# Patient Record
Sex: FEMALE | Race: WHITE | Employment: FULL TIME | URBAN - METROPOLITAN AREA
[De-identification: names, ages, dates, MRNs, and addresses within clinical notes are randomized per-mention and may not be internally consistent; named-entity substitution may affect disease eponyms.]

---

## 2017-01-01 ENCOUNTER — TRANSFERRED RECORDS (OUTPATIENT)
Dept: HEALTH INFORMATION MANAGEMENT | Facility: CLINIC | Age: 22
End: 2017-01-01

## 2017-01-01 LAB — PAP SMEAR - HIM PATIENT REPORTED: NORMAL

## 2018-03-26 ENCOUNTER — OFFICE VISIT (OUTPATIENT)
Dept: PEDIATRICS | Facility: CLINIC | Age: 23
End: 2018-03-26
Payer: COMMERCIAL

## 2018-03-26 VITALS
BODY MASS INDEX: 22 KG/M2 | TEMPERATURE: 97.6 F | WEIGHT: 136.9 LBS | DIASTOLIC BLOOD PRESSURE: 77 MMHG | HEART RATE: 111 BPM | SYSTOLIC BLOOD PRESSURE: 116 MMHG | HEIGHT: 66 IN

## 2018-03-26 DIAGNOSIS — H10.13 ALLERGIC CONJUNCTIVITIS, BILATERAL: Primary | ICD-10-CM

## 2018-03-26 PROCEDURE — 99203 OFFICE O/P NEW LOW 30 MIN: CPT | Performed by: NURSE PRACTITIONER

## 2018-03-26 RX ORDER — LEVONORGESTREL AND ETHINYL ESTRADIOL 0.15-0.03
KIT ORAL DAILY
COMMUNITY
Start: 2018-01-09 | End: 2020-11-02

## 2018-03-26 RX ORDER — OLOPATADINE HYDROCHLORIDE 2 MG/ML
1 SOLUTION/ DROPS OPHTHALMIC DAILY
Qty: 2.5 ML | Refills: 3 | Status: SHIPPED | OUTPATIENT
Start: 2018-03-26 | End: 2020-07-20

## 2018-03-26 NOTE — MR AVS SNAPSHOT
"              After Visit Summary   3/26/2018    Tram Haq    MRN: [id number]           Patient Information     Date Of Birth          1995        Visit Information        Provider Department      3/26/2018 12:45 PM Elizabeth Lange APRN CNP [facility]        Today's Diagnoses     Allergic conjunctivitis, bilateral    -  1      Care Instructions    Start an OTC claritin or zyrtec. If that doesn't work, try the prescription eye drop. Call me if no improvement.           Follow-ups after your visit        Follow-up notes from your care team     Return in about 2 months (around 5/26/2018) for Routine Visit.      Who to contact     If you have questions or need follow up information about today's clinic visit or your schedule please contact [facility] directly at [phone].  Normal or non-critical lab and imaging results will be communicated to you by [employer], letter or phone within 4 business days after the clinic has received the results. If you do not hear from us within 7 days, please contact the clinic through MyChart or phone. If you have a critical or abnormal lab result, we will notify you by phone as soon as possible.  Submit refill requests through [employer] or call your pharmacy and they will forward the refill request to us. Please allow 3 business days for your refill to be completed.          Additional Information About Your Visit        MyChart Information     [employer] lets you send messages to your doctor, view your test results, renew your prescriptions, schedule appointments and more. To sign up, go to [email or web address] . Click on \"Log in\" on the left side of the screen, which will take you to the Welcome page. Then click on \"Sign up Now\" on the right side of the page.     You will be asked to enter the access code listed below, as well as some personal information. Please follow the directions to create your username and password.     Your access " "code is: [email or web address]  Expires: 2018  1:12 PM     Your access code will  in 90 days. If you need help or a new code, please call your [facility] clinic or [phone].        Care EveryWhere ID     This is your Care EveryWhere ID. This could be used by other organizations to access your [facility] medical records  [id number]        Your Vitals Were     Pulse Temperature Height BMI (Body Mass Index)          111 97.6  F (36.4  C) (Tympanic) 5' 6\" (1.676 m) 22.1 kg/m2         Blood Pressure from Last 3 Encounters:   18 116/77    Weight from Last 3 Encounters:   18 136 lb 14.4 oz (62.1 kg)              Today, you had the following     No orders found for display         Today's Medication Changes          These changes are accurate as of 3/26/18  1:12 PM.  If you have any questions, ask your nurse or doctor.               Start taking these medicines.        Dose/Directions    olopatadine HCl 0.2 % Soln   Commonly known as:  PATADAY   Used for:  Allergic conjunctivitis, bilateral   Started by:  Elizabeth Lange, APRN CNP        Dose:  1 drop   Place 1 drop into both eyes daily   Quantity:  2.5 mL   Refills:  3            Where to get your medicines      Some of these will need a paper prescription and others can be bought over the counter.  Ask your nurse if you have questions.     Bring a paper prescription for each of these medications     olopatadine HCl 0.2 % Soln                Primary Care Provider Office Phone # Fax #    [facility] [phone] [address] [facility] [address]        Equal Access to Services     [facility]: Hadii allison lama hadashtrung [place], waaxda luqadaha, qaybta kaalmada ward velez. So [facility] [phone].    ATENCIÓN: Si habla español, tiene a deleon disposición servicios gratuitos de asistencia lingüística. Llame al [phone].    We comply with applicable federal civil rights laws " and [place] laws. We do not discriminate on the basis of race, color, national origin, age, disability, sex, sexual orientation, or gender identity.            Thank you!     Thank you for choosing [facility]  for your care. Our goal is always to provide you with excellent care. Hearing back from our patients is one way we can continue to improve our services. Please take a few minutes to complete the written survey that you may receive in the mail after your visit with us. Thank you!             Your Updated Medication List - Protect others around you: Learn how to safely use, store and throw away your medicines at www.disposemymeds.org.          This list is accurate as of 3/26/18  1:12 PM.  Always use your most recent med list.                   Brand Name Dispense Instructions for use Diagnosis    CHATEAL 0.15-30 MG-MCG per tablet   Generic drug:  levonorgestrel-ethinyl estradiol      daily        MULTIVITAMIN ADULT PO           olopatadine HCl 0.2 % Soln    PATADAY    2.5 mL    Place 1 drop into both eyes daily    Allergic conjunctivitis, bilateral

## 2018-03-26 NOTE — PATIENT INSTRUCTIONS
Start an OTC claritin or zyrtec. If that doesn't work, try the prescription eye drop. Call me if no improvement.

## 2018-03-26 NOTE — PROGRESS NOTES
"  SUBJECTIVE:   Tram aHq is a 23 year old female who presents to clinic today for the following health issues    ALLERGIES      Duration: 2wks    Description:   Nasal congestion: no  Sneezing: no   Red, itchy eyes: YES with mattering,feels like something is constantly in eyes, having to wipe at eyes due to changing her vision sometimes.    Accompanying signs and symptoms: runny nose    History (similar episodes/allergy testing): None    Precipitating or alleviating factors: None    Therapies tried and outcome: Eye drop OTC-not effective    Eye crusted over and conjunctival injection and watery x 2 wks. 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 Throughout the day, she can have symptoms comes and goes. She notes the eye d/c can be sticky and stringy, eyes can be very itchy. She has no hx of allergies. She does not wear contacts or glasses. Has tried a moistening drop to eyes withOUT relief. She denies fever, fuzzy vision with d/c. Denies cough, SOB, wheezing, ear pain, throat symptoms.     Last pap about 1 yr ago    Problem list and histories reviewed & adjusted, as indicated.  Additional history: as documented    There is no problem list on file for this patient.    History reviewed. No pertinent surgical history.    Social History   Substance Use Topics     Smoking status: Current Some Day Smoker     Smokeless tobacco: Never Used     Alcohol use Yes      Comment: socially     Family History   Problem Relation Age of Onset     Hypertension Mother            Reviewed and updated as needed this visit by clinical staff       Reviewed and updated as needed this visit by Provider         ROS:  Constitutional, HEENT, cardiovascular, pulmonary, gi and gu systems are negative, except as otherwise noted.    OBJECTIVE:     /77  Pulse 111  Temp 97.6  F (36.4  C) (Tympanic)  Ht 5' 6\" (1.676 m)  Wt 136 lb 14.4 oz (62.1 kg)  BMI 22.1 kg/m2  Body mass index is 22.1 kg/(m^2).  GENERAL: healthy, alert and no " distress  EYES: PERRL, EOMI and conjunctiva/corneas- conjunctival injection bilat  HENT: ear canals and TM's normal, nose and mouth without ulcers or lesions  NECK: no adenopathy, no asymmetry, masses, or scars and thyroid normal to palpation  RESP: lungs clear to auscultation - no rales, rhonchi or wheezes  CV: regular rate and rhythm, normal S1 S2, no S3 or S4, no murmur, click or rub, no peripheral edema and peripheral pulses strong  SKIN: no suspicious lesions or rashes      ASSESSMENT/PLAN:     1. Allergic conjunctivitis, bilateral  Diagnosis reviewed. Will try OTC antihistamine and the eye drops. If no improvement she will follow up with me  - olopatadine HCl (PATADAY) 0.2 % SOLN; Place 1 drop into both eyes daily  Dispense: 2.5 mL; Refill: 3      SIRIA Blackmon [facility]

## 2018-04-06 ENCOUNTER — TELEPHONE (OUTPATIENT)
Dept: PEDIATRICS | Facility: CLINIC | Age: 23
End: 2018-04-06

## 2018-04-06 NOTE — TELEPHONE ENCOUNTER
Panel Management Review          Composite cancer screening  Chart review shows that this patient is due/due soon for the following None  Summary:    Patient is due/failing the following:   GC SCREENING,TETANUS BOOSTER AND GARDASIL #3.    Action needed:   Patient needs non-fasting lab only appointment and Patient needs nurse only appointment.    Type of outreach:    Sent letter.    Questions for provider review:    None                                                                                                                                    Aissatou Casanova CMA([facility])

## 2018-04-06 NOTE — LETTER
April 6, 2018      Tram Haq  [address]   [place] [address]        Dear Tram,       We care about your health and have reviewed your health plan including your medical conditions, medications, and lab results.  Based on this review, it is recommended that you follow up regarding the following health topic(s):  -Chlamydia Screening  -Tetanus and Gardasil injections.    We recommend you take the following action(s):  -MAKE A LAB ONLY APPOINTMENT TO SCREEN FOR GONORRHEA/CHLAMYDIA AND MAKE A NURSE ONLY APPOINTMENT TO UPDATE YOUR TETANUS SHOT AND GET YOUR 3RD GARDASIL SHOT.     Please call us at the [facility] - [phone] (or use [employer]) to address the above recommendations.     Thank you for trusting [facility] and we appreciate the opportunity to serve you.  We look forward to supporting your healthcare needs in the future.    Healthy Regards,    Your Health Care Team  [facility]

## 2018-04-24 ENCOUNTER — TELEPHONE (OUTPATIENT)
Dept: PEDIATRICS | Facility: CLINIC | Age: 23
End: 2018-04-24

## 2018-04-24 DIAGNOSIS — H10.13 ALLERGIC CONJUNCTIVITIS, BILATERAL: Primary | ICD-10-CM

## 2018-04-24 NOTE — TELEPHONE ENCOUNTER
Patient states she was seen a couple weeks ago for allergies and given eye drops. They helped with the watery ness and the redness but now her eyes are all swollen. She would like to get a referral to an allergist. [phone] ok to lm.  Per office visit of 3/26/18  Start an OTC claritin or zyrtec. If that doesn't work, try the prescription eye drop. Call me if no improvement.         Order pended for signature if appropriate.  Jocelyn Sidhu, RN

## 2020-02-03 ENCOUNTER — OFFICE VISIT (OUTPATIENT)
Dept: FAMILY MEDICINE | Facility: CLINIC | Age: 25
End: 2020-02-03
Payer: COMMERCIAL

## 2020-02-03 VITALS
TEMPERATURE: 97.9 F | HEIGHT: 67 IN | DIASTOLIC BLOOD PRESSURE: 80 MMHG | SYSTOLIC BLOOD PRESSURE: 120 MMHG | WEIGHT: 139.4 LBS | BODY MASS INDEX: 21.88 KG/M2 | HEART RATE: 60 BPM | OXYGEN SATURATION: 97 %

## 2020-02-03 DIAGNOSIS — R21 RASH AND NONSPECIFIC SKIN ERUPTION: ICD-10-CM

## 2020-02-03 DIAGNOSIS — H66.001 NON-RECURRENT ACUTE SUPPURATIVE OTITIS MEDIA OF RIGHT EAR WITHOUT SPONTANEOUS RUPTURE OF TYMPANIC MEMBRANE: Primary | ICD-10-CM

## 2020-02-03 PROCEDURE — 99213 OFFICE O/P EST LOW 20 MIN: CPT | Performed by: NURSE PRACTITIONER

## 2020-02-03 RX ORDER — AZITHROMYCIN 250 MG/1
TABLET, FILM COATED ORAL
Qty: 6 TABLET | Refills: 0 | Status: SHIPPED | OUTPATIENT
Start: 2020-02-03 | End: 2020-07-20

## 2020-02-03 RX ORDER — AZITHROMYCIN 250 MG/1
TABLET, FILM COATED ORAL
Qty: 6 TABLET | Refills: 0 | Status: SHIPPED | OUTPATIENT
Start: 2020-02-03 | End: 2020-02-03

## 2020-02-03 ASSESSMENT — MIFFLIN-ST. JEOR: SCORE: 1397.55

## 2020-02-03 NOTE — PATIENT INSTRUCTIONS
-Focus on increasing singing and artwork to release stress  -Daily prayer or mindfullness meditation/yoga

## 2020-02-03 NOTE — PROGRESS NOTES
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 Has dealt with stress by singing and prayer, which has been helpful. She has been sleeping and eating well. She works in , and is frequently exposed to illness.   Patient Active Problem List   Diagnosis     Allergic conjunctivitis, bilateral     Past Surgical History:   Procedure Laterality Date     C URETER ENDOSCOPY THRU URETEROSTOMY  2005     FEMUR SURGERY Right 2002    sledding accident fracture       Social History     Tobacco Use     Smoking status: Current Some Day Smoker     Smokeless tobacco: Never Used   Substance Use Topics     Alcohol use: Yes     Comment: socially     Family History   Problem Relation Age of Onset     Hypertension Mother            -------------------------------------  Reviewed and updated as needed this visit by Provider         Review of Systems   ROS COMP: Constitutional, HEENT, cardiovascular, pulmonary, gi and gu systems are negative, except as otherwise noted.      Objective    /80   Pulse 60   Temp 97.9  F (36.6  C) (Oral)   Ht 1.69 m (5' 6.54\")   Wt 63.2 kg (139 lb 6.4 oz)   SpO2 97%   BMI 22.14 kg/m    Body mass index is 22.14 kg/m .  Physical Exam   GENERAL: healthy, alert and no " distress  HENT: ear canals and TM's normal, nose and mouth without ulcers or lesions  NECK: bilateral anterior cervical adenopathy, no asymmetry, masses, or scars and thyroid normal to palpation  RESP: lungs clear to auscultation - no rales, rhonchi or wheezes  CV: regular rate and rhythm, normal S1 S2, no S3 or S4, no murmur, click or rub, no peripheral edema and peripheral pulses strong  ABDOMEN: soft, nontender, no hepatosplenomegaly, no masses and bowel sounds normal  MS: no gross musculoskeletal defects noted, no edema  SKIN: xerosis - fingers and bilateral hands    Diagnostic Test Results:  Labs reviewed in Epic  No results found for this or any previous visit (from the past 24 hour(s)).        Assessment & Plan   Problem List Items Addressed This Visit     None      Visit Diagnoses     Non-recurrent acute suppurative otitis media of right ear without spontaneous rupture of tympanic membrane    -  Primary    Rash and nonspecific skin eruption        Relevant Orders    DERMATOLOGY REFERRAL             Tobacco Cessation:   reports that she has been smoking. She has never used smokeless tobacco.          See Patient Instructions  No follow-ups on file.    SIRIA Bean CNP  [facility] [place]

## 2020-07-20 ENCOUNTER — OFFICE VISIT (OUTPATIENT)
Dept: FAMILY MEDICINE | Facility: CLINIC | Age: 25
End: 2020-07-20
Payer: COMMERCIAL

## 2020-07-20 VITALS
SYSTOLIC BLOOD PRESSURE: 122 MMHG | HEIGHT: 67 IN | HEART RATE: 109 BPM | OXYGEN SATURATION: 98 % | DIASTOLIC BLOOD PRESSURE: 76 MMHG | RESPIRATION RATE: 16 BRPM | TEMPERATURE: 96.5 F | BODY MASS INDEX: 20.84 KG/M2 | WEIGHT: 132.8 LBS

## 2020-07-20 DIAGNOSIS — N64.4 MASTALGIA: ICD-10-CM

## 2020-07-20 DIAGNOSIS — B36.0 TINEA VERSICOLOR: Primary | ICD-10-CM

## 2020-07-20 PROCEDURE — 99213 OFFICE O/P EST LOW 20 MIN: CPT | Performed by: PHYSICIAN ASSISTANT

## 2020-07-20 RX ORDER — KETOCONAZOLE 20 MG/ML
SHAMPOO TOPICAL DAILY PRN
Qty: 120 ML | Refills: 0 | Status: SHIPPED | OUTPATIENT
Start: 2020-07-20

## 2020-07-20 ASSESSMENT — MIFFLIN-ST. JEOR: SCORE: 1367.62

## 2020-07-20 NOTE — PROGRESS NOTES
Subjective     Tram Thomas is a 25 year old female who presents to clinic today for the following health issues:    HPI     Breast pain      Duration: intermittent throughout the month    Description  Location: Left breast at about 5 o'clock- more so to touch     Intensity:  mild, moderate    Accompanying signs and symptoms: none    History  Previous similar problem: no   Previous evaluation:  none    Precipitating or alleviating factors:  Trauma or overuse: no   Aggravating factors include: states pain is when touch     Therapies tried and outcome: n/a       Tram is a healthy 26 y/o female with 1 month hx of left breast pain that has been intermittent.  Today this is feeling better.  The pain is located at the 5 o clock position.  The pain seemed to be worse just before her period.  She is currently menstruating and has noticed the pain has improved.  No masses, skin changes or nipple discharge.  No family hx of breast cancer. She is not taking any birth control, no concerns for pregnancy at this time.       She has noticed skin rash this summer on the skin of her chest and arms.  She has scattered hypopigmented flat spots, no itching, burning or pain. This seems to be worse as her skin has been tanning.        Patient Active Problem List   Diagnosis     Allergic conjunctivitis, bilateral     Past Surgical History:   Procedure Laterality Date     C URETER ENDOSCOPY THRU URETEROSTOMY  2005     FEMUR SURGERY Right 2002    sledding accident fracture       Social History     Tobacco Use     Smoking status: Current Some Day Smoker     Smokeless tobacco: Never Used   Substance Use Topics     Alcohol use: Yes     Comment: socially     Family History   Problem Relation Age of Onset     Hypertension Mother          Current Outpatient Medications   Medication Sig Dispense Refill     ketoconazole (NIZORAL) 2 % external shampoo Apply topically daily as needed for itching or irritation 120 mL 0     Prenatal Vit-Fe  "Fum-FA-Omega (PRENATAL MULTI +DHA PO)        CHATEAL 0.15-30 MG-MCG per tablet daily       Multiple Vitamins-Minerals (MULTIVITAMIN ADULT PO)        Allergies   Allergen Reactions     Morphine      As a child       Reviewed and updated as needed this visit by Provider         Review of Systems   Constitutional, HEENT, cardiovascular, pulmonary, GI, , musculoskeletal, neuro, skin, endocrine and psych systems are negative, except as otherwise noted.      Objective    /76   Pulse 109   Temp 96.5  F (35.8  C) (Tympanic)   Resp 16   Ht 1.69 m (5' 6.54\")   Wt 60.2 kg (132 lb 12.8 oz)   SpO2 98%   BMI 21.09 kg/m    Body mass index is 21.09 kg/m .  Physical Exam   GENERAL: healthy, alert and no distress  RESP: lungs clear to auscultation - no rales, rhonchi or wheezes  BREAST: normal without masses, tenderness or nipple discharge and no palpable axillary masses or adenopathy  CV: regular rate and rhythm, normal S1 S2, no S3 or S4, no murmur, click or rub  SKIN: scattered hypopigmented plaques scattered on skin of arms and chest bilaterally  PSYCH: mentation appears normal, affect normal/bright    Diagnostic Test Results:  none         Assessment & Plan     1. Tinea versicolor  Rash is consistent with tinea versicolor.  She may use Nizoral daily x 1-2 weeks, she will follow up if no improvement.  - ketoconazole (NIZORAL) 2 % external shampoo; Apply topically daily as needed for itching or irritation  Dispense: 120 mL; Refill: 0    2. Mastalgia  Normal breast exam today.  Etiology most likely hormonal changes as this is mostly occurring right before menses.  Discussed signs and symptoms to watch for, she will continue to monitor her symptoms and if persistent she will follow up and we will consider US.       Tobacco Cessation:   reports that she has been smoking. She has never used smokeless tobacco.          Return in about 3 months (around 10/20/2020) for Physical Exam. and pap.    Jordan Concepcion, " NAVIN  [facility] [place]

## 2020-10-05 ENCOUNTER — PRENATAL OFFICE VISIT (OUTPATIENT)
Dept: NURSING | Facility: CLINIC | Age: 25
End: 2020-10-05
Payer: COMMERCIAL

## 2020-10-05 DIAGNOSIS — Z34.00 SUPERVISION OF NORMAL FIRST PREGNANCY: Primary | ICD-10-CM

## 2020-10-05 PROCEDURE — 99207 PR NO CHARGE NURSE ONLY: CPT

## 2020-10-05 SDOH — ECONOMIC STABILITY: FOOD INSECURITY: WITHIN THE PAST 12 MONTHS, YOU WORRIED THAT YOUR FOOD WOULD RUN OUT BEFORE YOU GOT MONEY TO BUY MORE.: NOT ASKED

## 2020-10-05 SDOH — HEALTH STABILITY: MENTAL HEALTH: HOW OFTEN DO YOU HAVE 6 OR MORE DRINKS ON ONE OCCASION?: NOT ASKED

## 2020-10-05 SDOH — HEALTH STABILITY: MENTAL HEALTH: HOW MANY STANDARD DRINKS CONTAINING ALCOHOL DO YOU HAVE ON A TYPICAL DAY?: NOT ASKED

## 2020-10-05 SDOH — ECONOMIC STABILITY: FOOD INSECURITY: WITHIN THE PAST 12 MONTHS, THE FOOD YOU BOUGHT JUST DIDN'T LAST AND YOU DIDN'T HAVE MONEY TO GET MORE.: NOT ASKED

## 2020-10-05 SDOH — HEALTH STABILITY: MENTAL HEALTH: HOW OFTEN DO YOU HAVE A DRINK CONTAINING ALCOHOL?: NOT ASKED

## 2020-10-05 SDOH — ECONOMIC STABILITY: INCOME INSECURITY: HOW HARD IS IT FOR YOU TO PAY FOR THE VERY BASICS LIKE FOOD, HOUSING, MEDICAL CARE, AND HEATING?: NOT ASKED

## 2020-10-05 SDOH — ECONOMIC STABILITY: TRANSPORTATION INSECURITY
IN THE PAST 12 MONTHS, HAS THE LACK OF TRANSPORTATION KEPT YOU FROM MEDICAL APPOINTMENTS OR FROM GETTING MEDICATIONS?: NOT ASKED

## 2020-10-05 SDOH — ECONOMIC STABILITY: TRANSPORTATION INSECURITY
IN THE PAST 12 MONTHS, HAS LACK OF TRANSPORTATION KEPT YOU FROM MEETINGS, WORK, OR FROM GETTING THINGS NEEDED FOR DAILY LIVING?: NOT ASKED

## 2020-10-05 NOTE — PROGRESS NOTES
NPN nurse visit done over the phone. Pt will be given NPN folder and book at her upcoming appt.   Discussed optional screening available to assess chromosomal anomalies. Questions answered. Pt advised to call the clinic if she has any questions or concerns related to her pregnancy. Prenatal labs will be obtained at her upcoming appt. New prenatal visit scheduled on 10/19/20 with BRIANA Shafer.    7w3d    Per pt last PAP normal-1/1/17        Patient supplied answers from flow sheet for:  Prenatal OB Questionnaire.  Past Medical History  Diabetes?: No  Hypertension : No  Heart disease, mitral valve prolapse or rheumatic fever?: No  An autoimmune disease such as lupus or rheumatoid arthritis?: No  Kidney disease or urinary tract infection?: No  Epilepsy, seizures or spells?: No  Migraine headaches?: No  A stroke or loss of function or sensation?: No  Any other neurological problems?: No  Have you ever been treated for depression?: No  Are you having problems with crying spells or loss of self-esteem?: No  Have you ever required psychiatric care?: No  Have you ever had hepatitis, liver disease or jaundice?: No  Have you been treated for blood clots in your veins, deep vein thromosis, inflammation in the veins, thrombosis, phlebitis, pulmonary embolism or varicosities?: No  Have you had excessive bleeding after surgery or dental work?: No  Do you bleed more than other women after a cut or scratch?: No  Do you have a history of anemia?: (!) Yes(has taken iron and eat iron rich foods)  Have you ever had thyroid problems or taken thyroid medication?: No   Do you have any endocrine problems?: No  Have you ever been in a major accident or suffered serious trauma?: No  Within the last year, has anyone hit, slapped, kicked or otherwise hurt you?: No  In the last year, has anyone forced you to have sex when you didn't want to?: No    Past Medical History 2   Have you ever received a blood transfusion?: No  Would you refuse a blood  transfusion if a doctor judged it to be medically necessary?: No  Does anyone in your home smoke?: No  Do you use tobacco products?: No  Do you drink beer, wine or hard liquor?: No  Do you use any of the following: marijuana, speed, cocaine, heroin, hallucinogens or other drugs?: No   Is your blood type Rh negative?: No  Have you ever had abnormal antibodies in your blood?: No  Have you ever had asthma?: No  Have you ever had tuberculosis?: No  Do you have any allergies to drugs or over-the-counter medications?: (!) Yes  Allergies: Dust Mites, Aspartame, Ethanol, Venlafaxine, Hydrochloride, Sertraline: No  Have you had any breast problems?: No  Have you ever ?: No  Have you had any gynecological surgical procedures such as cervical conization, a LEEP procedure, laser treatment, cryosurgery of the cervix or a dilation and curettage, etc?: No  Have you ever had any other surgical procedures?: (!) Yes  Have you been hospitalized for a nonsurgical reason excluding normal delivery?: No  Have you ever had any anesthetic complications?: No  Have you ever had an abnormal pap smear?: No    Past Medical History (Continued)  Do you have a history of abnormalities of the uterus?: Unknown  Did your mother take CASSANDRA or any other hormones when she was pregnant with you?: Unknown  Did it take you more than a year to become pregnant?: No  Have you ever been evaluated or treated for infertility?: No  Is there a history of medical problems in your family, which you feel may be important to this pregnancy?: No  Do you have any other problems we have not asked about which you feel may be important to this pregnancy?: No    Symptoms since last menstrual period  Do you have any of the following symptoms: abdominal pain, blood in stools or urine, chest pain, shortness of breath, coughing or vomiting up blood, your heart racing or skipping beats, nausea and vomiting, pain on urination or vaginal discharge or bleed: No  Will the  patient be 35 years old or older at the time of delivery?: No    Has the patient, baby's father or anyone in either family had:  Thalassemia (Italian, Greek, Mediterranean or  background only) and an MCV result less than 80?: No  Neural tube defect such as meningomyelocele, spina bifida or anencephaly?: No  Congenital heart defect?: No  Down's Syndrome?: No  Vignesh-Sachs disease (Cheondoism, Cajun, Wolof-Syrian)?: No  Sickle cell disease or trait ()?: No  Hemophilia or other inherited problems of blood?: No  Muscular dystrophy?: No  Cystic fibrosis?: No  Pineola's chorea?: No  Mental retardation/autism?: No  Any other inherited genetic or chromosomal disorder?: No  Maternal metabolic disorder (e.g Insulin-dependent diabetes, PKU)?: No  A child with birth defects not listed above?: No  Recurrent pregnancy loss or stillbirth?: No   Has the patient had any medications/street drugs/alcohol since her last menstrual period?: No  Does the patient or baby's father have any other genetic risks?: No    Infection History   Do you object to being tested for Hepatitis B?: No  Do you object to being tested for HIV?: No   Do you feel that you are at high risk for coming in contact with the AIDS virus?: No  Have you ever been treated for tuberculosis?: No  Have you ever had a positive skin test for tuberculosis?: No  Do you live with someone who has tuberculosis?: No  Have you ever been exposed to tuberculosis?: No  Do you have genital herpes?: No  Does your partner have genital herpes?: No  Have you had a viral illness since your last period?: No  Have you ever had gonorrhea, chlamydia, syphilis, venereal warts, trichomoniasis, pelvic inflammatory disease or any other sexually transmitted disease?: No  Do you know if you are a genital group B streptococcus carrier?: Unknown  Have you had chicken pox/varicella?: No   Have you been vaccinated against chicken Pox?: (!) Yes  Have you had any other infectious diseases?:  No

## 2020-10-12 ENCOUNTER — ANCILLARY PROCEDURE (OUTPATIENT)
Dept: ULTRASOUND IMAGING | Facility: CLINIC | Age: 25
End: 2020-10-12
Payer: COMMERCIAL

## 2020-10-12 DIAGNOSIS — Z34.00 SUPERVISION OF NORMAL FIRST PREGNANCY: ICD-10-CM

## 2020-10-12 LAB
ABO + RH BLD: NORMAL
ABO + RH BLD: NORMAL
ALBUMIN UR-MCNC: NEGATIVE MG/DL
APPEARANCE UR: CLEAR
BILIRUB UR QL STRIP: NEGATIVE
BLD GP AB SCN SERPL QL: NORMAL
BLOOD BANK CMNT PATIENT-IMP: NORMAL
COLOR UR AUTO: YELLOW
ERYTHROCYTE [DISTWIDTH] IN BLOOD BY AUTOMATED COUNT: 12.5 % (ref 10–15)
GLUCOSE UR STRIP-MCNC: NEGATIVE MG/DL
HBV SURFACE AG SERPL QL IA: NONREACTIVE
HCT VFR BLD AUTO: 40.5 % (ref 35–47)
HGB BLD-MCNC: 13.6 G/DL (ref 11.7–15.7)
HGB UR QL STRIP: NEGATIVE
HIV 1+2 AB+HIV1 P24 AG SERPL QL IA: NONREACTIVE
KETONES UR STRIP-MCNC: NEGATIVE MG/DL
LEUKOCYTE ESTERASE UR QL STRIP: NEGATIVE
MCH RBC QN AUTO: 31.3 PG (ref 26.5–33)
MCHC RBC AUTO-ENTMCNC: 33.6 G/DL (ref 31.5–36.5)
MCV RBC AUTO: 93 FL (ref 78–100)
NITRATE UR QL: NEGATIVE
PH UR STRIP: 7 PH (ref 5–7)
PLATELET # BLD AUTO: 210 10E9/L (ref 150–450)
RBC # BLD AUTO: 4.35 10E12/L (ref 3.8–5.2)
RUBV IGG SERPL IA-ACNC: 25 IU/ML
SOURCE: NORMAL
SP GR UR STRIP: 1.01 (ref 1–1.03)
SPECIMEN EXP DATE BLD: NORMAL
T PALLIDUM AB SER QL: NONREACTIVE
UROBILINOGEN UR STRIP-ACNC: 0.2 EU/DL (ref 0.2–1)
WBC # BLD AUTO: 8.8 10E9/L (ref 4–11)

## 2020-10-12 PROCEDURE — 86901 BLOOD TYPING SEROLOGIC RH(D): CPT | Performed by: ADVANCED PRACTICE MIDWIFE

## 2020-10-12 PROCEDURE — 86900 BLOOD TYPING SEROLOGIC ABO: CPT | Performed by: ADVANCED PRACTICE MIDWIFE

## 2020-10-12 PROCEDURE — 85027 COMPLETE CBC AUTOMATED: CPT | Performed by: ADVANCED PRACTICE MIDWIFE

## 2020-10-12 PROCEDURE — 87086 URINE CULTURE/COLONY COUNT: CPT | Performed by: ADVANCED PRACTICE MIDWIFE

## 2020-10-12 PROCEDURE — 87389 HIV-1 AG W/HIV-1&-2 AB AG IA: CPT | Performed by: ADVANCED PRACTICE MIDWIFE

## 2020-10-12 PROCEDURE — 86762 RUBELLA ANTIBODY: CPT | Performed by: ADVANCED PRACTICE MIDWIFE

## 2020-10-12 PROCEDURE — 87340 HEPATITIS B SURFACE AG IA: CPT | Performed by: ADVANCED PRACTICE MIDWIFE

## 2020-10-12 PROCEDURE — 86850 RBC ANTIBODY SCREEN: CPT | Performed by: ADVANCED PRACTICE MIDWIFE

## 2020-10-12 PROCEDURE — 86780 TREPONEMA PALLIDUM: CPT | Mod: 90 | Performed by: ADVANCED PRACTICE MIDWIFE

## 2020-10-12 PROCEDURE — 81003 URINALYSIS AUTO W/O SCOPE: CPT | Performed by: ADVANCED PRACTICE MIDWIFE

## 2020-10-12 PROCEDURE — 99000 SPECIMEN HANDLING OFFICE-LAB: CPT | Performed by: ADVANCED PRACTICE MIDWIFE

## 2020-10-12 PROCEDURE — 76801 OB US < 14 WKS SINGLE FETUS: CPT

## 2020-10-13 LAB
BACTERIA SPEC CULT: NO GROWTH
Lab: NORMAL
SPECIMEN SOURCE: NORMAL

## 2020-10-16 ENCOUNTER — TELEPHONE (OUTPATIENT)
Dept: OBGYN | Facility: CLINIC | Age: 25
End: 2020-10-16

## 2020-10-16 NOTE — TELEPHONE ENCOUNTER
----- Message from Aracelis Carrillo CNM sent at 10/15/2020  5:32 PM CDT -----  Regarding: Please call pt.  Hi!  I am off for several days. I tried to call this pt but was unsuccessful in reaching her.  Could you please call her to let her know her labs and US were normal and her US is consistent with LMP CARIDAD of 5/21/21?  Thanks so much!    Aracelis

## 2020-10-19 ENCOUNTER — PRENATAL OFFICE VISIT (OUTPATIENT)
Dept: OBGYN | Facility: CLINIC | Age: 25
End: 2020-10-19
Payer: COMMERCIAL

## 2020-10-19 VITALS — WEIGHT: 134 LBS | BODY MASS INDEX: 21.28 KG/M2 | DIASTOLIC BLOOD PRESSURE: 72 MMHG | SYSTOLIC BLOOD PRESSURE: 110 MMHG

## 2020-10-19 DIAGNOSIS — Z23 NEEDS FLU SHOT: ICD-10-CM

## 2020-10-19 DIAGNOSIS — Z12.4 SCREENING FOR CERVICAL CANCER: Primary | ICD-10-CM

## 2020-10-19 PROCEDURE — 99207 PR FIRST OB VISIT: CPT | Performed by: ADVANCED PRACTICE MIDWIFE

## 2020-10-19 PROCEDURE — G0145 SCR C/V CYTO,THINLAYER,RESCR: HCPCS | Performed by: ADVANCED PRACTICE MIDWIFE

## 2020-10-19 PROCEDURE — 90686 IIV4 VACC NO PRSV 0.5 ML IM: CPT | Performed by: ADVANCED PRACTICE MIDWIFE

## 2020-10-19 PROCEDURE — 90471 IMMUNIZATION ADMIN: CPT | Performed by: ADVANCED PRACTICE MIDWIFE

## 2020-10-19 NOTE — PROGRESS NOTES
"     PRENATAL VISIT   FIRST OBSTETRICAL EXAM - OB     First prenatal visit at 9w3d   24yo     Last pap = 2017  EDPS = 5, \"never\" to #10     -IOB labs drawn.   -Reviewed prenatal care schedule.   -Optimal nutrition and weight gain discussed. Pregnancy weight gain of 25-35lb encouraged.   -Anticipatory guidance for common pregnancy questions and concerns reviewed.   -Danger s/sx for this trimester reviewed with patient.   -Reviewed genetic screening options with patient, patient does not elect for first trimester screening. The patient does not elect for quad screening.   -Reviewed carrier testing options with patient, patient does not elect for testing or referral to genetic counseling.  -IOB packet given and reviewed with patient.   -CNM services and hospital options reviewed; emergency and scheduling phone numbers given to patient.     The patient has the following high risk factors for preeclampsia: none. Therefore, low-dose aspirin will not be initiated.    The patient has the following moderate risk factors for preeclampsia:first pregnancy.  Because the patient .does not have 2 factors, low dose aspirin will not be initiated   -Antepartum VTE risk factors none.  -Patient not at increased risk for overt diabetes, so A1C will not be added to IOB labs today.  -Pt not a candidate for an antepartum OB consult.    -Return to clinic 4 weeks.  Total time spent with patient: 30 minutes, >50% time spent counseling and coordinating care.     Tram Thomas is a 25 year old  here today for her first obstetrical exam at 9w3d. Here by herself. This pregnancy is planned. The patient reports nausea, but no vomiting, fatigue and some mild breast tenderness.  Patient's last menstrual period was 2020 (exact date)., predicting an expected date of delivery of Estimated Date of Delivery: May 21, 2021. Last period was normal. Her previous three cycles were normal. Her pregnancy is dated by LMP which is consistent " with early US.  Her nausea is much decreased as opposed to a couple weeks ago.  The patient states that she is in a monogamous relationship and states that she is safe. Offered GC/CT screening today and patient declines.    Diet: whole grains, eggs, small amt of meat, almond milk, nuts, yogurt, fruit, veggies, smoothies  Exercise: yoga once a week, active job with walking and with children    Current symptoms also include: fatigue, breast pain.   Risk factors: none. Pregnancy Risk Factors: none   VTE antepartum risk factors (two or more risk factors, or 1 * risk factor, places patient at higher risk): none.   Clinical history/risk factors requiring antepartum OB consult: no.   The patient has the following risk factors for overt diabetes: no. Plus the additional risk factor(s) of: none.      Social History:      Occupation: Behavioral therapist at [employer]   Partners name: Boris   ?   OB History    Para Term  AB Living   1 0 0 0 0 0   SAB TAB Ectopic Multiple Live Births   0 0 0 0 0      # Outcome Date GA Lbr Dylan/2nd Weight Sex Delivery Anes PTL Lv   1 Current               History:   Past Medical History:   Diagnosis Date     Chronic UTI       Past Surgical History:   Procedure Laterality Date     C URETER ENDOSCOPY THRU URETEROSTOMY  2005     FEMUR SURGERY Right 2002    sledding accident fracture      Family History   Problem Relation Age of Onset     Hypertension Mother      Depression Mother      No Known Problems Father      No Known Problems Sister      No Known Problems Brother      No Known Problems Sister       Social History     Tobacco Use     Smoking status: Former Smoker     Smokeless tobacco: Never Used   Substance Use Topics     Alcohol use: Not Currently     Comment: socially     Drug use: No      Current Outpatient Medications   Medication Sig Dispense Refill     CHATEAL 0.15-30 MG-MCG per tablet daily       ketoconazole (NIZORAL) 2 % external shampoo Apply topically daily as  "needed for itching or irritation (Patient not taking: Reported on 10/5/2020) 120 mL 0     Multiple Vitamins-Minerals (MULTIVITAMIN ADULT PO)        Prenatal Vit-Fe Fum-FA-Omega (PRENATAL MULTI +DHA PO)         Allergies   Allergen Reactions     Morphine      As a child      The patient's medical, surgical and family histories were reviewed and not pertinent to this visit.   Pap smear: Last Pap: 1/2017, Result: NILM, Previous History: normal, Any history of abnormal: no. Next Due: today.      EPDS score today: 5.\"never\" to #10   History of anxiety or depression: no    Review of Systems   General: Fatigue but otherwise denies problem   Eyes: Denies problem   Ears/Nose/Throat: Denies problem   Cardiovascular: Denies problem   Respiratory: Denies problem   Gastrointestinal: Nausea without vomiting, otherwise negative   Genitourinary: Denies any discharge, vaginal bleeding or itchiness or any other problem   Musculoskeletal: Breast tenderness otherwise denies problem   Skin: Denies problem   Neurologic: Denies problem   Psychiatric: Denies problem   Endocrine: Denies problem   Heme/Lymphatic: Denies problem   Allergic/Immunologic: Denies problem       OBJECTIVE:     EXAM:  LMP 08/14/2020 (Exact Date)  There is no height or weight on file to calculate BMI.    GENERAL: healthy, alert and no distress  EYES: Eyes grossly normal to inspection, and conjunctivae and sclerae normal  NECK: no adenopathy, no asymmetry, masses, or scars and thyroid normal to palpation  RESP: lungs clear to auscultation - no rales, rhonchi or wheezes  BREAST: normal without masses, tenderness or nipple discharge and no palpable axillary masses or adenopathy  CV: regular rate and rhythm, normal S1 S2, no S3 or S4, no murmur, click or rub, no peripheral edema and peripheral pulses strong  ABDOMEN: soft, nontender, no hepatosplenomegaly, no masses and bowel sounds normal  PELVIC: vulva normal and without lesions, vagina pink with normal rugae and " physiologic discharge, cervix closed and nulliparous, no pain with introduction of speculum  MS: no gross musculoskeletal defects noted, no edema  SKIN: no suspicious lesions or rashes  NEURO: Normal strength and tone, mentation intact and speech normal  PSYCH: mentation appears normal, affect normal/bright    ASSESSMENT/PLAN:       ICD-10-CM    1. Screening for cervical cancer  Z12.4 Pap imaged thin layer screen reflex to HPV if ASCUS - recommend age 25 - 29   2. Needs flu shot  Z23 HC FLU VAC PRESRV FREE QUAD SPLIT VIR > 6 MONTHS IM     ADMIN 1st VACCINE   3.  Supervision of normal first pregnancy    25 year old , On 2020 patient is 9w3d weeks of pregnancy with CARIDAD of 2021, by Last Menstrual Period      Counseling given:   - Follow up in 4-6 weeks for return OB visit.  - Recommended weight gain for pregnancy: 25-35 lbs.  - Reviewed travel precautions, following MD guidelines for COVID, who can be in room for birth, where they go after birth, pain management options

## 2020-10-19 NOTE — LETTER
October 27, 2020      Tramsa MABLE Thomas  [address] [facility] [address]        Dear ,    We are happy to inform you that your recent Pap smear is normal.    It is recommended that you have your next Pap smear in 3 years. You will also need to return to the clinic every year for an annual wellness visit.    If you have additional questions regarding this result, please contact our office and we will be happy to assist you.      Sincerely,    Your [facility] Care Team

## 2020-10-21 LAB
COPATH REPORT: NORMAL
PAP: NORMAL

## 2020-10-25 ENCOUNTER — NURSE TRIAGE (OUTPATIENT)
Dept: NURSING | Facility: CLINIC | Age: 25
End: 2020-10-25

## 2020-10-26 ENCOUNTER — TELEPHONE (OUTPATIENT)
Facility: CLINIC | Age: 25
End: 2020-10-26

## 2020-10-26 ENCOUNTER — TELEPHONE (OUTPATIENT)
Dept: OBGYN | Facility: CLINIC | Age: 25
End: 2020-10-26

## 2020-10-26 NOTE — TELEPHONE ENCOUNTER
Called to find out if she needs to be tested for COVID-19 infection following a possible exposure to a coworker on Tuesday and Thursday that tested positive for coronavirus.  States that they were in same rooms on and off and they were both wearing masks within 6 feet.  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  Caller states she is not having any symptom at this time.  Caller wanted to know if she can do the test when she gets to where she is traveling to.  Krystin Duncan RN    Reason for Disposition    [1] Caller requesting NON-URGENT health information AND [2] PCP's office is the best resource     Testing for COVID-19 infection    Additional Information    Negative: [1] Caller is not with the adult (patient) AND [2] reporting urgent symptoms    Negative: Lab result questions    Negative: Medication questions    Negative: Caller can't be reached by phone    Negative: Caller has already spoken to PCP or another triager    Negative: RN needs further essential information from caller in order to complete triage    Negative: Requesting regular office appointment    Protocols used: INFORMATION ONLY CALL-A-

## 2020-10-27 ENCOUNTER — TELEPHONE (OUTPATIENT)
Dept: OBGYN | Facility: CLINIC | Age: 25
End: 2020-10-27

## 2020-10-27 NOTE — TELEPHONE ENCOUNTER
Pt called back. Pt states that employees mask at work, however she did sit at a table with the COVID+ coworker during lunch without a mask.     Encouraged to go to [email or web address] to schedule COVID testing. Counseled to quarantine until test results are back.

## 2020-10-27 NOTE — TELEPHONE ENCOUNTER
Received text page from PT stating she found out a coworker of hers tested positive for COVID. They last worked together this past Thursday.     Returned call to pt. Left voice mail directing her to [email or web address] regarding COVID testing. If she has questions, encouraged to call back.     SIRIA Velásquez, CNM

## 2020-10-30 ENCOUNTER — NURSE TRIAGE (OUTPATIENT)
Dept: FAMILY MEDICINE | Facility: CLINIC | Age: 25
End: 2020-10-30

## 2020-10-30 NOTE — TELEPHONE ENCOUNTER
"  Additional Information    Negative: Ear pain is the main symptom    Negative: Hearing loss (complete or partial) is the main symptom    Negative: Earwax is the main concern    Negative: Has nasal allergies and they are acting up    Negative: Earache lasts > 1 hour    Negative: Pus or cloudy discharge from ear canal    Negative: Patient wants to be seen    Ear congestion    Answer Assessment - Initial Assessment Questions  1. LOCATION: \"Which ear is involved?\"        Left ear  2. SENSATION: \"Describe how the ear feels.\"       Feels plugged  3. ONSET:  \"When did the ear symptoms start?\"        3 days ago  4. PAIN: \"Do you also have an earache?\" If so, ask: \"How bad is it?\" (Scale 1-10; or mild, moderate, severe)      No pain  5. CAUSE: \"What do you think is causing the ear congestion?\"      Not sure  6. URI: \"Do you have a runny nose or cough?\"       no  7. NASAL ALLERGIES: \"Are there symptoms of hay fever, such as sneezing or a clear nasal discharge?\"      no  8. PREGNANCY: \"Is there any chance you are pregnant?\" \"When was your last menstrual period?\"      Yes.  11 weeks    Protocols used: EAR - CONGESTION-A-OH    HOME CARE: You should be able to treat this at home.      TREATMENT FOR SYMPTOMS ON TAKE OFF (FOR POSITIVE EAR PRESSURE):  * Chew gum, or  * Yawn, or   * Swallow, or   * Swallow while pinching your nostrils.      TREATMENT - ANTIHISTAMINE MEDICINES FOR HAY FEVER:  * Antihistamine medicines help reduce sneezing, itching and runny nose. They are the drug of choice for treating symptoms of hay fever.  * BENADRYL (diphenhydramine) and CHLORTRIMETON (chlorpheniramine; Chlor-tripolon) are available over the counter (OTC). Both of these antihistamine medicines work well. They can cause sleepiness.  * LORATADINE is a newer (second generation) antihistamine that is now available OTC. The dosage of loratadine (e.g., OTC Claritin, Alavert) is 10 mg once a day. Alavert is not available in [place].  * CETIRIZINE is " another newer (second generation) antihistamine and is now available OTC. The dosage of cetirizine (e.g., OTC Zyrtec, Reactine) is 10 mg once a day.  * Before taking any medicine, read all the instructions on the package.      CAUTION - ANTIHISTAMINES:  * Examples include diphenhydramine (Benadryl) and chlorpheniramine (Chlortrimeton, Chlor-tripolon)  * May cause sleepiness. Do not drink alcohol, drive, or operate dangerous machinery while taking antihistamines.  * Do not take these medicines if you have prostate problems.      CALL BACK IF:   * Ear congestion lasts over 48 hours   * Ear pain or fever occurs   * You become worse      Patient/Caregiver understands and will follow care advice? Yes, plans to follow advice     Sophie LOMELI RN  EP Triage

## 2020-10-30 NOTE — TELEPHONE ENCOUNTER
Symptoms    Describe your symptoms: Ear plugged    Any pain: No    How long have you been having symptoms: 3 days     Have you been seen for this:  No    Appointment offered?: No    Triage offered?: Yes: Because she's waiting for her Covid results, because 2 co- workers tested positive this last week. And is pregnant.    Home remedies tried: Ear wax remover ,from [employer]    Requested Pharmacy: [facility]    Okay to leave a detailed message? Yes at Cell number on file:    Telephone Information:   Mobile [phone]

## 2020-10-30 NOTE — TELEPHONE ENCOUNTER
Patient has never been seen in [place]  Last visit - 7/20/20 in .   Routing to       Lindsay Wilson RN  [facility]

## 2020-11-02 ENCOUNTER — OFFICE VISIT (OUTPATIENT)
Dept: FAMILY MEDICINE | Facility: CLINIC | Age: 25
End: 2020-11-02
Payer: COMMERCIAL

## 2020-11-02 VITALS
OXYGEN SATURATION: 98 % | HEIGHT: 67 IN | BODY MASS INDEX: 21.82 KG/M2 | WEIGHT: 139 LBS | SYSTOLIC BLOOD PRESSURE: 118 MMHG | TEMPERATURE: 98.4 F | HEART RATE: 102 BPM | DIASTOLIC BLOOD PRESSURE: 63 MMHG

## 2020-11-02 DIAGNOSIS — H91.92 DECREASED HEARING, LEFT: ICD-10-CM

## 2020-11-02 DIAGNOSIS — H61.22 IMPACTED CERUMEN OF LEFT EAR: Primary | ICD-10-CM

## 2020-11-02 PROCEDURE — 69210 REMOVE IMPACTED EAR WAX UNI: CPT | Mod: LT | Performed by: NURSE PRACTITIONER

## 2020-11-02 ASSESSMENT — MIFFLIN-ST. JEOR: SCORE: 1395.74

## 2020-11-02 NOTE — PATIENT INSTRUCTIONS
Patient Education     Earwax Removal    The ear canal makes earwax (cerumen) from the canal s lining. The ears make wax to lubricate and protect the ear canal. The ear canal is the tube that connects the middle ear to the outside of the ear. The wax protects the ear from bacteria, infection, and damage from water or trauma.  The wax that forms in the canal naturally moves toward the outside of the ear and falls out. In some cases, the ear may make too much wax. If the wax causes problems or keeps the healthcare provider from seeing into the ear, the extra wax may be removed.  Too much wax can affect your hearing. It can cause itching. In rare cases, it can be painful. Earwax should not be removed unless it is causing a problem. You should not stick objects including q-tips or cotton into your ear to remove wax unless told to do so by your healthcare provider.  Healthcare providers can remove earwax safely. Often irrigation and syringing will help. At times instruments or suction are used to remove the wax. It is important to stay still during the procedure to avoid damage to the ear canal. But removing earwax generally doesn t hurt. You will not need anesthesia or pain medicine when the provider removes the earwax.  A number of conditions lead to earwax buildup. These include some skin problems, a narrow ear canal, or ears that make too much earwax. Using cotton swabs in the canal pushes earwax deeper into the ear and contributes to the buildup of earwax.  Home care    The healthcare provider may recommend mineral oil or an over-the-counter (OTC)ardrop to use at home to soften the earwax. He or she may also recommend a home irrigation or syringing kit. Use these products only if the provider recommends them. Carefully follow the instructions given.    Don t use mineral oil or OTC eardrops if you might have an ear infection or a ruptured eardrum. Tell your healthcare provider right away if you have diabetes or an  immune disorder.    Don t use cotton swabs in your ears. Cotton swabs may push wax deeper into the ear canal or damage the eardrum. Use cotton gauze or a wet washcloth  to gently remove wax on the outside of the ear and around the opening to the ear canal.    Don't use any probing device or object such as cotton-tipped swabs or nino pins to clean the inside of your ears.    Don t use ear candles to clean your ears. Candling can be dangerous. It can burn the ear canal. It can also make the condition worse instead of better.    Don t use cold water to rinse the ear. This will make you dizzy. If your provider tells you to rinse your ear, use only warm water or follow his or her instructions.    Check the ear for signs of infection or irritation listed below under When to seek medical advice.  Steps for using eardrops  1. Warm the medicine bottle by rubbing it between your hands for a few minutes.  2. Lie down on your side, with the affected ear up.  3. Place the recommended number of drops in the ear. Wet a cotton ball with the medicine. Gently put the cotton ball into the ear opening.    Follow-up care  Follow up with your healthcare provider, or as directed.  When to seek medical advice  Call the provider right away if you have:    Ear pain that gets worse    Fever of 100.4F F (38 C) or higher, or as directed by your healthcare provider    Worsening wax buildup    Severe pain, dizziness, or nausea    Bleeding from the ear    Hearing problems    Signs of irritation from the eardrops, such as burning, stinging, or swelling and tenderness    Foul-smelling fluid draining from the ear    Swelling, redness, or tenderness of the outer ear    Headache, neck pain, or stiff neck  StayWell last reviewed this educational content on 6/1/2017 2000-2020 [employer] [address] [place] [address], [place] [address]. All rights reserved. This information is not intended as a substitute for professional medical care. Always  follow your healthcare professional's instructions.           Patient Education     Impacted Earwax     Inner ear structures including ear canal and eardrum.   Impacted earwax is a buildup of the natural wax in the ear. Impacted earwax is very common. It can cause symptoms such as hearing loss. It can also make it hard for a healthcare provider to check your ear.   Understanding earwax  Tiny glands in your ear make substances that combine with dead skin cells to form earwax. Earwax helps protect your ear canal from water, dirt, infection, and injury. Over time, earwax travels from the inner part of your ear canal to the entrance of the canal. Then it falls away naturally. But in some cases, it can t travel to the entrance of the canal. This may be because of a health condition or objects put in the ear. With age, earwax tends to become harder and less fluid. Older adults are more likely to have problems with earwax buildup.   What causes impacted earwax?  Earwax can build up because of many health conditions. Some cause a physical blockage. Others cause too much earwax to be made. Health conditions that can cause earwax buildup include:     Bony blockage in the ear (osteoma or exostoses)    Infections, such as an outer ear infection (external otitis)    Skin disease, such as eczema    Autoimmune diseases, such as lupus    A narrowed ear canal from birth, chronic inflammation, or injury    Too much earwax because of injury    Too much earwax because of  water in the ear canal  Putting objects in the ear again and again can also cause impacted earwax. For example, putting cotton swabs in the ear may push the wax deeper into the ear. Over time, this may cause blockage. Hearing aids, swimming plugs, and swim molds can also cause this problem when used again and again.   In some cases, the cause of impacted earwax is not known.  Symptoms of impacted earwax  Excess earwax often does not cause any symptoms, unless there is  a large amount of buildup. Then it may cause symptoms such as:     Hearing loss    Earache    Sense of ear fullness    Itching in the ear    Odor from the ear    Ear drainage    Dizziness    Ringing in the ears    Cough  Treatment for impacted earwax  If you don t have symptoms, you may not need treatment. Often the earwax goes away on its own with time. If you have symptoms, you may have 1 or more treatments such as:     Ear drops to soften the earwax. This helps it leave the ear over time.    Rinsing the ear canal with water. This is done in a healthcare provider s office.    Removing the earwax with small tools. This is also done in a provider s office.  In rare cases, some treatments for earwax removal may cause complications such as:    Outer ear infection    Earache    Short-term hearing loss    Dizziness    Water trapped in the ear canal    Hole in the eardrum    Ringing in the ears    Bleeding from the ear  Talk with your healthcare provider about which risks apply most to you.  Healthcare providers don't advise using ear candles or ear vacuum kits. These methods are not shown to work and may cause problems.   Preventing impacted earwax  You may not be able to prevent impacted earwax if you have a health condition that causes it, such as eczema. In other cases, you may be able to prevent earwax buildup by:     Using ear drops once a week    Having a regular ear cleaning about every 6 months    Not using cotton swabs in the ear  When to call the healthcare provider  Call your healthcare provider right away if you have:     Symptoms of impacted earwax    Severe symptoms after earwax removal, such as bleeding or severe ear pain    [employer] last reviewed this educational content on 9/1/2019 2000-2020 [employer]. [address] [place] [address], [place] [address]. All rights reserved. This information is not intended as a substitute for professional medical care. Always follow your healthcare  professional's instructions.

## 2020-11-02 NOTE — PROGRESS NOTES
"Subjective   Trammaureen Thomas is a 25 year old female who presents to clinic today for the following health issues:    HPI     Pt is currently pregnant - in first trimentser  Concern - Plugged Ears  Onset: x5 days completely plugged -- 1 week prior felt wax increasing  Description: Left ear plugged  Intensity: sharp a couple times in both ears  Progression of Symptoms:  worsening  Accompanying Signs & Symptoms: decreased hearing in L ear  Previous history of similar problem: none  Precipitating factors:        Worsened by: nothing nothing  Alleviating factors:        Improved by: OTC generic Debrox  Therapies tried and outcome: Vinegar and water drops - no relief -- - Used 2x per day for 4 days -  ear wax removal and bulb flusher - no relief      Reviewed and updated as needed this visit by provider:  Tobacco  Allergies  Meds  Problems  Med Hx  Surg Hx  Fam Hx          Review of Systems   Constitutional, HEENT, cardiovascular, pulmonary, GI, , musculoskeletal, neuro, skin, endocrine and psych systems are negative, except as otherwise noted in the HPI.          Objective   /63 (BP Location: Left arm, Patient Position: Chair, Cuff Size: Adult Regular)   Pulse 102   Temp 98.4  F (36.9  C) (Tympanic)   Ht 1.69 m (5' 6.54\")   Wt 63 kg (139 lb)   LMP 08/14/2020 (Exact Date)   SpO2 98%   Breastfeeding No   BMI 22.08 kg/m   Body mass index is 22.08 kg/m .  Physical Exam   GENERAL: healthy, alert, well nourished, well hydrated, no distress  HENT: right ear canal and TM- normal; left ear canal with cerumen impaction; normal ear canal and TM after cerumen removed nose- normal; Mouth- no ulcers, no lesions    Cerumen impaction left ear removed with curette by this NP as well as ear lavage by MA.        Assessment & Plan   Tram was seen today for plugged ears.    Diagnoses and all orders for this visit:    Impacted cerumen of left ear  Decreased hearing, left  Cerumen removed easily from left ear.  " Hearing improved.  No signs of infection.    At home care discussed.  Written education provided.  Tram verbalizes understanding of plan of care and is in agreement.     See Patient Instructions    Return if symptoms worsen or fail to improve.     Sintia Sidhu, P-[address] [address]  [email or web address]  [email or web address]   Office: [phone]

## 2020-11-23 ENCOUNTER — PRENATAL OFFICE VISIT (OUTPATIENT)
Dept: OBGYN | Facility: CLINIC | Age: 25
End: 2020-11-23
Payer: COMMERCIAL

## 2020-11-23 VITALS — WEIGHT: 144 LBS | SYSTOLIC BLOOD PRESSURE: 114 MMHG | DIASTOLIC BLOOD PRESSURE: 64 MMHG | BODY MASS INDEX: 22.87 KG/M2

## 2020-11-23 DIAGNOSIS — O26.02 EXCESSIVE WEIGHT GAIN DURING PREGNANCY IN SECOND TRIMESTER: ICD-10-CM

## 2020-11-23 DIAGNOSIS — Z34.02 ENCOUNTER FOR SUPERVISION OF NORMAL FIRST PREGNANCY IN SECOND TRIMESTER: Primary | ICD-10-CM

## 2020-11-23 PROCEDURE — 99207 PR PRENATAL VISIT: CPT | Performed by: ADVANCED PRACTICE MIDWIFE

## 2020-11-23 NOTE — PROGRESS NOTES
S:Feels well.  Has been having more headaches.  Drinking 6 5 bottles per day.  Has been feeling emotional, but less so than the first trimester.  Feeling a little bit more energy in the last few days.  Previously was doing yoga 1 time per week and walking a lot at work.  Hoping to be able to do more exercise.  Reviewed weight gain chart and recommendations.  She is trending above recommendations right now.  Reviewed ways that she could utilize to help guide weight gain in pregnancy.  She continues to decline genetic screening.  She is taking an online class about pregnancy and taking care of her baby during Covid 19.  She is trying to keep herself isolated and hoping to get a job where she can work from home.  Discussed when to expect quickening.  Fetal movement No  Denies loss of fluid/vb/contractions/pelvic pain  Depression screening done  O:  LMP 08/14/2020 (Exact Date)   Exam:  Constitutional: healthy, alert and no distress  Respiratory: Respirations even and unlabored  Gastrointestinal: Abdomen soft, non-tender. Fundus measures appropriately for gestational age. Fetal heart tones heard easily.  Psychiatric: mentation appears normal and affect normal/bright  A: Supervision of normal first pregnancy  Excessive weight gain in pregnancy  P: Discussed options for quad screen today:  declined quad screen today  Anatomy ultrasound next visit between 20-22 weeks, ordered  Return to clinic 4 weeks    Aracelis Carrillo CNM

## 2021-01-04 ENCOUNTER — NURSE TRIAGE (OUTPATIENT)
Dept: NURSING | Facility: CLINIC | Age: 26
End: 2021-01-04

## 2021-01-04 ENCOUNTER — PRENATAL OFFICE VISIT (OUTPATIENT)
Dept: OBGYN | Facility: CLINIC | Age: 26
End: 2021-01-04
Payer: COMMERCIAL

## 2021-01-04 VITALS — WEIGHT: 160 LBS | BODY MASS INDEX: 25.41 KG/M2 | SYSTOLIC BLOOD PRESSURE: 108 MMHG | DIASTOLIC BLOOD PRESSURE: 58 MMHG

## 2021-01-04 DIAGNOSIS — O26.02 EXCESSIVE WEIGHT GAIN DURING PREGNANCY IN SECOND TRIMESTER: ICD-10-CM

## 2021-01-04 DIAGNOSIS — Z34.02 ENCOUNTER FOR SUPERVISION OF NORMAL FIRST PREGNANCY IN SECOND TRIMESTER: ICD-10-CM

## 2021-01-04 DIAGNOSIS — Z34.02 ENCOUNTER FOR SUPERVISION OF NORMAL FIRST PREGNANCY IN SECOND TRIMESTER: Primary | ICD-10-CM

## 2021-01-04 PROCEDURE — 76805 OB US >/= 14 WKS SNGL FETUS: CPT | Performed by: FAMILY MEDICINE

## 2021-01-04 PROCEDURE — 99207 PR PRENATAL VISIT: CPT | Performed by: ADVANCED PRACTICE MIDWIFE

## 2021-01-04 NOTE — PROGRESS NOTES
"S: Feels well,  Has started feeling fetal movement.  Denies uterine cramping, vaginal bleeding or leaking of fluid    Anatomy US this morning, results not yet available.     Discussed 30lb weight gain thus far in pregnancy. Given her pre-pregnancy BMI, a total weight gain of 25-35lbs is recommended.   Tram states she feels her diet has \"not been great\" over the holidays and she has been eating a lot of sweets. She tries to take walks daily, but otherwise has not been exercising in this pregnancy.   We discussed diet in pregnancy. Encouraged focus on small, frequent meals with plenty of protein. Encouraged to avoid excessive carbs/sugar. Reviewed lean proteins. Recommended daily light exercise. She sates she has been craving juice every day. Discussed juice can be very sugary and caloric. We discussed trying sugar-free flavor packets in water instead of juice.   Provided with handout on healthy diet and exercise in pregnancy.     Reports eye twitching for past few days. Occasional heartburn. Overall fatigued.   O: Vitals: /58 (BP Location: Right arm, Cuff Size: Adult Regular)   Wt 72.6 kg (160 lb)   LMP 08/14/2020 (Exact Date)   BMI 25.41 kg/m    BMI= Body mass index is 25.41 kg/m .  Exam:  Constitutional: healthy, alert and no distress  Respiratory: respirations even and unlabored  Gastrointestinal: Abdomen soft, non-tender. Fundus measures appropriate for gestational age. Fetal heart tones hear without difficulty and within normal limits  : Deferred  Psychiatric: mentation appears normal and affect normal/bright  A/P:  1. (Z34.02) Encounter for supervision of normal first pregnancy in second trimester  (primary encounter diagnosis)    2. (O26.02) Excessive weight gain during pregnancy in second trimester  - 30lb TWG        Discussed 20 week fetal screen - done today, will reach out with final results  Encouraged patient to call with any questions or concerns.    SIRIA Velásquez, BRIANA    "

## 2021-01-04 NOTE — PATIENT INSTRUCTIONS
Patient Education     Healthy Eating Habits During Pregnancy    It s important to develop healthy eating habits while you are pregnant, for you as well as for your baby. Here are some ways to stay healthy.  Aim for a healthy weight  A slow, steady rate of weight gain is often best. After the first trimester, you may gain about a pound a week. If you were overweight before pregnancy, you need to gain fewer pounds. Your healthcare provider can give you a healthy weight goal for your pregnancy.  Don t diet  Now is not the time to diet. You may not get enough of the nutrients you and your baby need. Instead, learn how to be a healthy eater. Start by doing it for your baby. Soon, you may do it for yourself.  Vitamins and supplements  Talk with your healthcare provider about taking these and other prenatal vitamins and supplements.    Iron makes the extra blood you need now.    Calcium and vitamin D help build and keep strong bones.    Folic acid helps prevent certain birth defects.    Iodine helps the thyroid work right.    Some vitamins may not be safe to take. Your healthcare provider will tell you which ones to avoid.  Fluids  Drink at least 8 to 10 cups of fluid daily. Your baby needs fluids. Fluids also decrease constipation, flush out toxins and waste, limit swelling, and help prevent bladder infections. Water is best. Other good choices are:    Water or seltzer water with a slice of lemon or lime (These can also help ease an upset stomach.)    Clear soups that are low in salt    Low-fat or fat-free milk, soy or rice milk with calcium added    Popsicles or gelatin  Things to avoid  Some things might harm your growing baby. Don t eat or drink:    Alcohol    Unpasteurized dairy foods and juices    Raw or undercooked meat, poultry, fish, or eggs    Unwashed fruits and vegetables    Prepared meats, like deli meats or hot dogs, unless heated until steaming hot    Fish that are high in mercury, like shark, swordfish,  molly mackerel, tilefish, and albacore tuna  Things to limit  Ask your healthcare provider whether it s safe to eat or drink:    Caffeine    Artificial sweeteners    Organ meats    Certain types of fish    Fish and shellfish that contain mercury in lower amounts, like shrimp, canned light tuna, salmon, pollock, and catfish  [employer] last reviewed this educational content on 2/1/2018 2000-2020 [employer] [address] [place] [address], [place] [address]. All rights reserved. This information is not intended as a substitute for professional medical care. Always follow your healthcare professional's instructions.           Patient Education     Exercise During Pregnancy  Regular exercise can help you adapt to the changes your body is going through during pregnancy. Exercising may help you relax, and it gets you ready for labor and delivery. Talk to your healthcare provider about the kinds of activities you can do. Then go ahead and enjoy them.    Get started  Even if you didn t exercise before pregnancy, it is not too late to start. Choose an activity that you like and that fits your lifestyle. Begin slowly and build up a little at a time. Be sure to check with your healthcare provider before starting any exercise program. The following tips may help you get started:    Choose a time and place to exercise each day.    Wear loose-fitting clothes and comfortable athletic shoes.    Stretch before and after you exercise. (Be sure to stretch slowly and to hold stretches for 30 to 40 seconds.)  Be active  Unless your healthcare provider says otherwise, try to exercise for 30 minutes or more most days of the week:    Overall conditioning, like swimming, bicycling, or walking, is especially beneficial.    Aerobics and exercises that increase your pulse rate help condition your body and strengthen your heart. Ask about special prenatal aerobics classes.  Exercise safely  These tips will help you have a safe, healthy  workout:    Stay cool. Stop exercising if you feel overheated.    Slow down if you re out of breath. If you can t talk during exercise, lower the intensity of the workout.    Monitor the intensity of your workout. Only do moderate-intensity (not strenuous) exercise.    Stay off your back. Lying on your back can decrease blood flow to your baby.    Drink water before, during and after your workout.    Eat 300 extra calories a day. A light snack before and after you exercise will help keep your energy up.    Avoid activities requiring balancing skills later in pregnancy.  Do Kegel exercises  Kegel exercises strengthen the pelvic floor muscles used in childbirth. These muscles are the same ones used to stop the flow of urine. Do Kegel exercises daily:    Squeeze your pelvic floor muscles for a count of 3.    Relax, then squeeze again.    Repeat 10 to 15 times in a row at least 3 times a day.    You can do Kegel exercises anytime and anywhere.  Keep walking  No matter what other exercise you do, try to walk whenever you can:    If you re working all day, take a lunchtime walk in the park with a friend.    When you shop, park away from the store entrance and walk the extra distance.    Take the stairs instead of the elevator.     When to stop exercising and call your healthcare provider  Call your healthcare provider right away if you have:    Shortness of breath before starting exercise    Vaginal bleeding    Dizziness or feeling faint    Chest pain    Headache    Decreased fetal movement     contractions     Muscle weakness    Calf pain or swelling    Fluid leaking from the vagina   [employer] last reviewed this educational content on 2017-2020 [employer]. [address] [place] [address], [place] [address]. All rights reserved. This information is not intended as a substitute for professional medical care. Always follow your healthcare professional's instructions.

## 2021-01-04 NOTE — TELEPHONE ENCOUNTER
RN Triage:    Spoke with 25 yr old Tram who has appointment at 9:00 am this morning for her 20 week U/S.    Pt is wondering if she can have anyone with her at the appointment.      PLAN:  Spoke with Emelina in OB/Triage at [facility], who indicates no additional visitor is allowed in the clinic at this time.    Advised Pt of visitor policy.    Pt voiced understanding.    Bernarda Dupont RN  [facility]      Additional Information    Negative: Nursing judgment    Negative: Nursing judgment    Negative: Nursing judgment    Negative: Nursing judgment    Information only question and nurse able to answer    Protocols used: NO PROTOCOL AVAILABLE - INFORMATION ONLY-A-[place]

## 2021-02-01 ENCOUNTER — PRENATAL OFFICE VISIT (OUTPATIENT)
Dept: OBGYN | Facility: CLINIC | Age: 26
End: 2021-02-01
Payer: COMMERCIAL

## 2021-02-01 VITALS — DIASTOLIC BLOOD PRESSURE: 56 MMHG | WEIGHT: 168 LBS | SYSTOLIC BLOOD PRESSURE: 108 MMHG | BODY MASS INDEX: 26.68 KG/M2

## 2021-02-01 DIAGNOSIS — Z34.01 ENCOUNTER FOR SUPERVISION OF NORMAL FIRST PREGNANCY IN FIRST TRIMESTER: Primary | ICD-10-CM

## 2021-02-01 DIAGNOSIS — O26.02 EXCESSIVE WEIGHT GAIN DURING PREGNANCY IN SECOND TRIMESTER: ICD-10-CM

## 2021-02-01 PROCEDURE — 99207 PR PRENATAL VISIT: CPT | Performed by: ADVANCED PRACTICE MIDWIFE

## 2021-02-01 NOTE — PATIENT INSTRUCTIONS
Weeks 24 thru 26 - Gestational Age (Fetal Age - Weeks 22 thru 24)- Beginning the third trimester:  If your baby was delivered now, it could possibly survive outside of your body, with the assistance of medical technology. The fetus has developed patterns of  sleeping and waking and mom will begin to notice when each of these takes place. The fetus has a startle reflex, and the air sacs in the lungs have begun formation. The brain will be developing rapidly over the next few weeks. The nervous system has developed enough to control some functions. The fetus has reached about 14 inches in length and weighs about 2   pounds.    GESTATIONAL DIABETES SCREENING    All pregnant women are screened at least once for diabetes as part of their prenatal care. A woman has gestational diabetes if she has high blood sugars for the first time during pregnancy.      Diabetes can harm your health and the health of your baby.  But if we find the diabetes early in pregnancy we will watch your health closely and prevent further problems.       We will check for gestational diabetes during 28 week visit. Please note you can not do this prior to 24 weeks of gestation.      Plan to spend about an hour at the clinic.  When you check in let us know that you will be having your diabetes screening that day.       We will give you a 50 gram glucose drink that you have 5 minutes to consume.  Exactly one hour later you will have draw blood from your arm to check your blood sugar level.      We will call you to let you know if your results are normal.  If the results are normal no more testing will be needed.  If your results are not normal we will discuss follow up testing with you.        You may eat prior to the testing but it is not recommended to eat or drink very sweet things such as pop, juice, candy or dessert type foods.  Eat a high protein, low carb meals prior to testing.    If you have any questions please call:    [facility]  "  [phone]    Labor Instructions for Midwife Patients    When to call:  Both during and after office hours call [phone]. There is a Nurse Midwife available to take your calls and answer your questions 24 hours a day.     When to call:  Call anytime you have important concerns about you or your baby.     Call if:    You are having contractions at regular intervals about 5-6 minutes apart lasting 30-60 seconds and becoming increasingly more intense     You have an uncontrollable gush of fluid from your vagina or feel a pop and gush like your water has broken    You have HEAVY bleeding, like heavy period, blood running down your legs, or  soaking a pad.     Some bleeding after a pelvic exam, after intercourse, or in labor when your cervix is dilating is normal and is referred to as \"bloody show\"    You have severe, continuous back or abdominal pain    You feel it is time to go to the hospital    If this is your first labor, call when contractions are very intense and have been about every 3-4 minutes for about an hour    If it is your second labor or more, call when contractions are strong and about every 3-5 minutes or sooner depending on your level of discomfort.     Keep in mind we are always here for you! If you have questions, concerns please don't hesitate to call us.     What to eat/drink in labor: Drink plenty of fluid (water most importantly, juice, soda or tea without caffeine). Eat rice, pasta, soup, cereal, bread/toast, and fruit. Avoid dairy and greasy food as they are difficult to digest and you may experience some nausea during labor.    Comfort measures:    Baths and showers (ok even with ruptured membranes, it may temporarily slow contractions if you are still in the early stage of labor)    Warm/hot packs for back pain or discomfort    Back, belly, or thigh massages    Standing, rocking, walking, leaning over bed or tables, side-lying and sleeping    Miscellaneous:     Contractions are timed " from the beginning of one to the beginning of the next    Try hard to sleep during the early stage of labor when you are not that uncomfortable. Timing of contractions at this point is not important    Even if you cannot sleep, resting in bed or on the couch can help you maintain your energy for labor    When you arrive at the hospital the nurse will check your baby's heartbeat, check your cervix, and will call us. The midwife on call will come in and be with you when you are in active labor    After hours you need to enter the hospital through the emergency room

## 2021-02-01 NOTE — PROGRESS NOTES
S: Feels well,  Baby active.  Denies uterine cramping, vaginal bleeding or leaking of fluid    Reviewed weight gain. Current TWG 38lbs. Pt states she overall feels she has a balanced diet. Encouraged her to incorporate dietary changes as we discussed at her last visit, and to start light exercising daily.       O: Vitals: /56 (BP Location: Left arm, Cuff Size: Adult Regular)   Wt 76.2 kg (168 lb)   LMP 08/14/2020 (Exact Date)   BMI 26.68 kg/m    BMI= Body mass index is 26.68 kg/m .  Exam:  Constitutional: healthy, alert and no distress  Respiratory: respirations even and unlabored  Gastrointestinal: Abdomen soft, non-tender. Fundus measures appropriate for gestational age. Fetal heart tones hear without difficulty and within normal limits  : Deferred  Psychiatric: mentation appears normal and affect normal/bright  A/P:  1. (Z34.01) Encounter for supervision of normal first pregnancy in first trimester  (primary encounter diagnosis)      2. (O26.02) Excessive weight gain during pregnancy in second trimester  - 38lb TWG, counseled on diet/exercise, continue to monitor, consider growth US if measuring s>d    Discussed GCT/repeat RPR for next visit, handout provided, reminded of longer appointment.  Tdap next visit; reviewed CDC recommendations and partner/family vaccination recommended as well.  Need for Rhogam? No  Encouraged patient to call with any questions or concerns.  Return to clinic 4 weeks    SIRIA Velásquez, BRIANA

## 2021-02-01 NOTE — NURSING NOTE
"Chief Complaint   Patient presents with     Prenatal Care       Initial /56 (BP Location: Left arm, Cuff Size: Adult Regular)   Wt 76.2 kg (168 lb)   LMP 2020 (Exact Date)   BMI 26.68 kg/m   Estimated body mass index is 26.68 kg/m  as calculated from the following:    Height as of 20: 1.69 m (5' 6.54\").    Weight as of this encounter: 76.2 kg (168 lb).  BP completed using cuff size: regular    Questioned patient about current smoking habits.  Pt. has never smoked.          +FM    Alec Cummings MA             "

## 2021-03-01 ENCOUNTER — PRENATAL OFFICE VISIT (OUTPATIENT)
Dept: OBGYN | Facility: CLINIC | Age: 26
End: 2021-03-01
Payer: COMMERCIAL

## 2021-03-01 VITALS — DIASTOLIC BLOOD PRESSURE: 66 MMHG | WEIGHT: 173 LBS | BODY MASS INDEX: 27.48 KG/M2 | SYSTOLIC BLOOD PRESSURE: 114 MMHG

## 2021-03-01 DIAGNOSIS — Z34.03 ENCOUNTER FOR SUPERVISION OF NORMAL FIRST PREGNANCY IN THIRD TRIMESTER: Primary | ICD-10-CM

## 2021-03-01 LAB
ERYTHROCYTE [DISTWIDTH] IN BLOOD BY AUTOMATED COUNT: 12.8 % (ref 10–15)
HCT VFR BLD AUTO: 34.1 % (ref 35–47)
HGB BLD-MCNC: 11.7 G/DL (ref 11.7–15.7)
MCH RBC QN AUTO: 31.3 PG (ref 26.5–33)
MCHC RBC AUTO-ENTMCNC: 34.3 G/DL (ref 31.5–36.5)
MCV RBC AUTO: 91 FL (ref 78–100)
PLATELET # BLD AUTO: 194 10E9/L (ref 150–450)
RBC # BLD AUTO: 3.74 10E12/L (ref 3.8–5.2)
WBC # BLD AUTO: 11.3 10E9/L (ref 4–11)

## 2021-03-01 PROCEDURE — 99207 PR PRENATAL VISIT: CPT | Performed by: ADVANCED PRACTICE MIDWIFE

## 2021-03-01 PROCEDURE — 99000 SPECIMEN HANDLING OFFICE-LAB: CPT | Performed by: ADVANCED PRACTICE MIDWIFE

## 2021-03-01 PROCEDURE — 86780 TREPONEMA PALLIDUM: CPT | Mod: 90 | Performed by: ADVANCED PRACTICE MIDWIFE

## 2021-03-01 PROCEDURE — 90471 IMMUNIZATION ADMIN: CPT | Performed by: ADVANCED PRACTICE MIDWIFE

## 2021-03-01 PROCEDURE — 82950 GLUCOSE TEST: CPT | Performed by: ADVANCED PRACTICE MIDWIFE

## 2021-03-01 PROCEDURE — 36415 COLL VENOUS BLD VENIPUNCTURE: CPT | Performed by: ADVANCED PRACTICE MIDWIFE

## 2021-03-01 PROCEDURE — 85027 COMPLETE CBC AUTOMATED: CPT | Performed by: ADVANCED PRACTICE MIDWIFE

## 2021-03-01 PROCEDURE — 90715 TDAP VACCINE 7 YRS/> IM: CPT | Performed by: ADVANCED PRACTICE MIDWIFE

## 2021-03-01 NOTE — PATIENT INSTRUCTIONS
GESTATIONAL DIABETES SCREENING    All pregnant women are screened at least once for diabetes as part of their prenatal care. A woman has gestational diabetes if she has high blood sugars for the first time during pregnancy.      Diabetes can harm your health and the health of your baby.  But if we find the diabetes early in pregnancy we will watch your health closely and prevent further problems.       We will check for gestational diabetes during 28 week visit. Please note you can not do this prior to 24 weeks of gestation.      Plan to spend about an hour at the clinic.  When you check in let us know that you will be having your diabetes screening that day.       We will give you a 50 gram glucose drink that you have 5 minutes to consume.  Exactly one hour later you will have draw blood from your arm to check your blood sugar level.      We will call you to let you know if your results are normal.  If the results are normal no more testing will be needed.  If your results are not normal we will discuss follow up testing with you.        You may eat prior to the testing but it is not recommended to eat or drink very sweet things such as pop, juice, candy or dessert type foods.  Eat a high protein, low carb meals prior to testing.    If you have any questions please call:    [facility]   [phone]  PREECLAMPSIA SIGNS AND SYMPTOMS    Preeclampsia is a dangerous condition that some women develop in the second half of pregnancy. It can also begin after the baby is born.  Preeclampsia causes high blood pressure and can cause problems with many organ systems in your body.  It can also affect the growth of your baby. The exact cause of preeclampsia is unknown, however, there are signs and symptoms to watch for:    -A bad headache that doesn't improve with Tylenol  -Visual changes such as spots, flashes of light, blurry vision  -Pain in the upper right part of your abdomen, especially under the ribs that doesn't go  "away  -Nausea and/or vomiting  -Feeling extremely tired  -Yellowing of the skin and/or eyes  -Feeling \"not quite right\" or that something is wrong  -An extreme amount of swelling (some swelling in pregnancy is very normal)    If your midwife feels that you are developing preeclampsia, you will have lab tests drawn and will be monitored very closely.     If you are experiencing anyof these symptoms,   Call [facility]   [phone]    SIGNS OF  LABOR    Labor is  if it happens more than three weeks before your due date.    It can be hard to know if you are in labor, since the symptoms can be like the normal feelings of pregnancy.  Often, the only difference is the symptoms increase or they don't go away.     Signs of  labor can include:      Contractions which can feel like period cramps or gas pain.  You may feel it in the lower part of your abdomen, in your back, or as a pressure feeling in your bottom.  It is often regular, coming every 5 or 10 minutes, and  lasting about 30-60 seconds. Some contractions are normal during pregnancy (Guthrie mendoza contractions) but if you are feeling more than 5-6 in one hour that is NOT normal    If this occurs empty your bladder, then drink 2-3 glasses of water, eat a snack, and lay down on your left side. Put your hand on your abdomen to count the contractions.  If after one hour of resting you have still had 5-6 contractions call your clinic right away.      If you feel a pop, gush, or trickle of fluid it may mean that your bag of water has broken and you should contact the clinic       You may also experience loose stools and/or rectal pressure       Listen to your body, if something doesn't seem right please call us at the clinic    Risk Factors      Previous  delivery    Bacterial Vaginosis- if you notice a fishy smell to your discharge or experience vaginal itching/discomfort you should be evaluated for infection    Smoking    Drug " abuse    Adolescent (teen) pregnancy or advanced maternal age (AMA) age 35 and over    Dehydration (this may not cause  labor but it can cause contractions)    If you think you are in  labor we may do some lab testing in the clinic or send you to the hospital for evaluation    Please call us if you are concerned you are in  labor.    [facility]  [phone]

## 2021-03-01 NOTE — PROGRESS NOTES
S: Feels well,  Baby active.  Denies uterine cramping, vaginal bleeding or leaking of fluid    She reports she still has heartburn after drinking coffee in AM and after eating spicy foods. Her symptoms are usually resolved with TUMS. She also reports occasional shortness of breath.     O: Vitals: /66 (BP Location: Right arm, Cuff Size: Adult Regular)   Wt 78.5 kg (173 lb)   LMP 08/14/2020 (Exact Date)   BMI 27.48 kg/m    BMI= Body mass index is 27.48 kg/m .  Exam:  Constitutional: healthy, alert and no distress  Respiratory: respirations even and unlabored  Gastrointestinal: Abdomen soft, non-tender. Fundus measures appropriate for gestational age. Fetal heart tones hear without difficulty and within normal limits  : Deferred  Psychiatric: mentation appears normal and affect normal/bright  A/P:  1. (Z34.03) Encounter for supervision of normal first pregnancy in third trimester  (primary encounter diagnosis)  Plan: Glucose tolerance, gest screen, 1 hour,         Treponema Abs w Reflex to RPR and Titer, CBC         with platelets, TDAP VACCINE (Adacel, Boostrix)        [[id number]]      EPDS 3  PTL and pre-e s/s reviewed  GCT/repeat RPR today, handout provided.    Tdap given; reviewed CDC recommendations and partner/family vaccination recommended as well.  Need for Rhogam? No.   Discussed patient options for postpartum contraception. Patient is planning condoms  Encouraged patient to call with any questions or concerns.  Return to clinic 2 weeks    SIRIA Velásquez, BRIANA

## 2021-03-02 LAB
GLUCOSE 1H P 50 G GLC PO SERPL-MCNC: 98 MG/DL (ref 60–129)
T PALLIDUM AB SER QL: NONREACTIVE

## 2021-03-15 ENCOUNTER — PRENATAL OFFICE VISIT (OUTPATIENT)
Dept: OBGYN | Facility: CLINIC | Age: 26
End: 2021-03-15
Payer: COMMERCIAL

## 2021-03-15 VITALS — DIASTOLIC BLOOD PRESSURE: 70 MMHG | SYSTOLIC BLOOD PRESSURE: 118 MMHG | BODY MASS INDEX: 28.43 KG/M2 | WEIGHT: 179 LBS

## 2021-03-15 DIAGNOSIS — Z34.01 ENCOUNTER FOR SUPERVISION OF NORMAL FIRST PREGNANCY IN FIRST TRIMESTER: ICD-10-CM

## 2021-03-15 DIAGNOSIS — A05.9 FOOD POISONING: ICD-10-CM

## 2021-03-15 PROCEDURE — 99207 PR PRENATAL VISIT: CPT | Performed by: ADVANCED PRACTICE MIDWIFE

## 2021-03-15 NOTE — PROGRESS NOTES
Tram Thomas presents to clinic alone at 30w3d for a routine prenatal appointment. She reports she is feeling well and has no concerns. Normal fetal movement. Denies bleeding, LOF, or regular, painful contractions. Denies headache / vision changes / RUQ pain.    Excessive weight gain: Reviewed total weight gain of 19.5 kg (43 lb). Above range for expected total weight gain of 11.5 kg (25 lb)-16 kg (35 lb). Discussed increased activity now that the weather is improving.    Size greater than dates: if persistent NV, consider growth ultrasound.    Food poisoning: Last week shared restaurant Mexican food with 3 others, all got sick, self-limiting diarrhea. No fever, stayed hydrated. Reviewed if no fever, likely not an infection that would affect pregnancy.    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 Provided MFM handout on COVID vaccine in pregnancy-- she will consider.  Pregnancy checklist updated. Next visit: sign up for [employer], consider growth ultrasound, provide resources for prenatal education (unable to send via [employer] this visit). Warning signs reviewed. RTC in 2 weeks, sooner if problems.

## 2021-03-16 PROBLEM — A05.9 FOOD POISONING: Status: ACTIVE | Noted: 2021-03-16

## 2021-03-29 ENCOUNTER — PRENATAL OFFICE VISIT (OUTPATIENT)
Dept: OBGYN | Facility: CLINIC | Age: 26
End: 2021-03-29
Payer: COMMERCIAL

## 2021-03-29 VITALS
SYSTOLIC BLOOD PRESSURE: 110 MMHG | BODY MASS INDEX: 28.39 KG/M2 | HEIGHT: 67 IN | DIASTOLIC BLOOD PRESSURE: 70 MMHG | WEIGHT: 180.9 LBS

## 2021-03-29 DIAGNOSIS — O26.843 UTERINE SIZE-DATE DISCREPANCY IN THIRD TRIMESTER: Primary | ICD-10-CM

## 2021-03-29 DIAGNOSIS — O26.02 EXCESSIVE WEIGHT GAIN DURING PREGNANCY IN SECOND TRIMESTER: ICD-10-CM

## 2021-03-29 PROCEDURE — 99207 PR PRENATAL VISIT: CPT | Performed by: ADVANCED PRACTICE MIDWIFE

## 2021-03-29 ASSESSMENT — MIFFLIN-ST. JEOR: SCORE: 1580.25

## 2021-03-29 NOTE — PROGRESS NOTES
"S: Feels well,  Baby active.  Denies uterine cramping, vaginal bleeding or leaking of fluid.    She presents today with FOB Boris.     She was recommended a CBE course by a friend and plans to look into it. Also discussed [place] as a great resource for classes.     O: Vitals: /70   Ht 1.689 m (5' 6.5\")   Wt 82.1 kg (180 lb 14.4 oz)   LMP 08/14/2020 (Exact Date)   BMI 28.76 kg/m    BMI= Body mass index is 28.76 kg/m .  Exam:  Constitutional: healthy, alert and no distress  Respiratory: respirations even and unlabored  Gastrointestinal: Abdomen soft, non-tender. Fundus measures large for gestational age. Fetal heart tones hear without difficulty and within normal limits  : Deferred  Psychiatric: mentation appears normal and affect normal/bright  A/P:  1. (O26.843) Uterine size-date discrepancy in third trimester  (primary encounter diagnosis)  Plan: US OB >14 Weeks Follow Up  - Measuring s>d consistently for past few visits.   - Discussed recommendation for growth US and pt agrees with plan of care    2. (O26.02) Excessive weight gain during pregnancy in second trimester  - 50lb TWG  - Growth US ordered   - Pt continues working on diet and increasing exercise     Discussed plans for labor. Discussed patient options for postpartum contraception. Patient is planning condoms  Encouraged patient to call with any questions or concerns.  Return to clinic 2 weeks    SIRIA Velásquez, BRIANA              "

## 2021-03-29 NOTE — NURSING NOTE
"32w3d    Chief Complaint   Patient presents with     Prenatal Care       Initial /70   Ht 1.689 m (5' 6.5\")   Wt 82.1 kg (180 lb 14.4 oz)   LMP 2020 (Exact Date)   BMI 28.76 kg/m   Estimated body mass index is 28.76 kg/m  as calculated from the following:    Height as of this encounter: 1.689 m (5' 6.5\").    Weight as of this encounter: 82.1 kg (180 lb 14.4 oz).  BP completed using cuff size: regular    Questioned patient about current smoking habits.  Pt. quit smoking some time ago.          The following HM Due: NONE             "

## 2021-04-12 ENCOUNTER — PRENATAL OFFICE VISIT (OUTPATIENT)
Dept: OBGYN | Facility: CLINIC | Age: 26
End: 2021-04-12
Attending: ADVANCED PRACTICE MIDWIFE
Payer: COMMERCIAL

## 2021-04-12 VITALS — BODY MASS INDEX: 28.3 KG/M2 | WEIGHT: 178 LBS | DIASTOLIC BLOOD PRESSURE: 64 MMHG | SYSTOLIC BLOOD PRESSURE: 106 MMHG

## 2021-04-12 DIAGNOSIS — O26.843 UTERINE SIZE-DATE DISCREPANCY IN THIRD TRIMESTER: ICD-10-CM

## 2021-04-12 DIAGNOSIS — Z34.01 ENCOUNTER FOR SUPERVISION OF NORMAL FIRST PREGNANCY IN FIRST TRIMESTER: ICD-10-CM

## 2021-04-12 PROCEDURE — 99207 PR PRENATAL VISIT: CPT | Performed by: ADVANCED PRACTICE MIDWIFE

## 2021-04-12 NOTE — LETTER
April 12, 2021      Tram Thomas  [address] [address]            To whom it may concern:    Tram Thomas is pregnant with an CARIDAD of 5/21/2021. She has an important prenatal appointment on Thursday 4/15/2021 at 10:00 in the morning and needs to be excused from work in order to attend. This was the only appointment time available in order to provide her with essential care.    Please feel free to contact our office if you have questions or concerns.          Sincerely,        SIRIA Reynoso, CNM

## 2021-04-12 NOTE — NURSING NOTE
"Chief Complaint   Patient presents with     Prenatal Care       Initial /64 (BP Location: Left arm, Cuff Size: Adult Regular)   Wt 80.7 kg (178 lb)   LMP 2020 (Exact Date)   BMI 28.30 kg/m   Estimated body mass index is 28.3 kg/m  as calculated from the following:    Height as of 3/29/21: 1.689 m (5' 6.5\").    Weight as of this encounter: 80.7 kg (178 lb).  BP completed using cuff size: regular    Questioned patient about current smoking habits.  Pt. has never smoked.                   "

## 2021-04-13 NOTE — PROGRESS NOTES
"Tram Thomas presents to clinic with Boris at 34w3d for a routine prenatal appointment. She reports she is feeling well and has no concerns. Normal fetal movement. Denies bleeding, LOF, or regular, painful contractions. Denies headache / vision changes / RUQ pain.    Reviewed total weight gain of 21.8 kg (48 lb). Above range for expected total weight gain of 11.5 kg (25 lb)-16 kg (35 lb)     Size>dates: growth ultrasound had been scheduled for 4/26-- amenable to moving this up to 4/15 for earlier measurement. Will make plan of care pending results. Letter provided for Tram's work stating she needs additional time off for this appointment.    COVID-19 Mitigation: Would accept vaccination postpartum. Strongly encouraged all other adults (Boris, grandparents, etc) to get vaccinated if able to provide \"safety net\" around Tram and baby.  Still unable to sign up for [employer]-- resources sent by mail. Pregnancy checklist updated. Reviewed GBS, hgb & check for vertex at next visit. Warning signs reviewed. RTC in 2 weeks, sooner if problems.      "

## 2021-04-15 ENCOUNTER — ANCILLARY PROCEDURE (OUTPATIENT)
Dept: ULTRASOUND IMAGING | Facility: CLINIC | Age: 26
End: 2021-04-15
Attending: ADVANCED PRACTICE MIDWIFE
Payer: COMMERCIAL

## 2021-04-15 DIAGNOSIS — O26.843 UTERINE SIZE-DATE DISCREPANCY IN THIRD TRIMESTER: ICD-10-CM

## 2021-04-15 PROCEDURE — 76816 OB US FOLLOW-UP PER FETUS: CPT | Performed by: FAMILY MEDICINE

## 2021-04-26 ENCOUNTER — PRENATAL OFFICE VISIT (OUTPATIENT)
Dept: OBGYN | Facility: CLINIC | Age: 26
End: 2021-04-26
Payer: COMMERCIAL

## 2021-04-26 VITALS — WEIGHT: 185 LBS | DIASTOLIC BLOOD PRESSURE: 68 MMHG | BODY MASS INDEX: 29.41 KG/M2 | SYSTOLIC BLOOD PRESSURE: 112 MMHG

## 2021-04-26 DIAGNOSIS — O26.843 UTERINE SIZE-DATE DISCREPANCY IN THIRD TRIMESTER: ICD-10-CM

## 2021-04-26 DIAGNOSIS — Z34.01 ENCOUNTER FOR SUPERVISION OF NORMAL FIRST PREGNANCY IN FIRST TRIMESTER: Primary | ICD-10-CM

## 2021-04-26 DIAGNOSIS — O26.03 EXCESSIVE WEIGHT GAIN DURING PREGNANCY IN THIRD TRIMESTER: ICD-10-CM

## 2021-04-26 PROCEDURE — 99207 PR PRENATAL VISIT: CPT | Performed by: ADVANCED PRACTICE MIDWIFE

## 2021-04-26 PROCEDURE — 87653 STREP B DNA AMP PROBE: CPT | Performed by: ADVANCED PRACTICE MIDWIFE

## 2021-04-26 NOTE — PATIENT INSTRUCTIONS
"Labor Instructions for Midwife Patients    When to call:  Both during and after office hours call [phone]. There is a Nurse Midwife available to take your calls and answer your questions 24 hours a day.     When to call:  Call anytime you have important concerns about you or your baby.     Call if:    You are having contractions at regular intervals about 5-6 minutes apart lasting 30-60 seconds and becoming increasingly more intense     You have an uncontrollable gush of fluid from your vagina or feel a pop and gush like your water has broken    You have HEAVY bleeding, like heavy period, blood running down your legs, or  soaking a pad.     Some bleeding after a pelvic exam, after intercourse, or in labor when your cervix is dilating is normal and is referred to as \"bloody show\"    You have severe, continuous back or abdominal pain    You feel it is time to go to the hospital    If this is your first labor, call when contractions are very intense and have been about every 3-4 minutes for about an hour    If it is your second labor or more, call when contractions are strong and about every 3-5 minutes or sooner depending on your level of discomfort.     Keep in mind we are always here for you! If you have questions, concerns please don't hesitate to call us.     What to eat/drink in labor: Drink plenty of fluid (water most importantly, juice, soda or tea without caffeine). Eat rice, pasta, soup, cereal, bread/toast, and fruit. Avoid dairy and greasy food as they are difficult to digest and you may experience some nausea during labor.    Comfort measures:    Baths and showers (ok even with ruptured membranes, it may temporarily slow contractions if you are still in the early stage of labor)    Warm/hot packs for back pain or discomfort    Back, belly, or thigh massages    Standing, rocking, walking, leaning over bed or tables, side-lying and sleeping    Miscellaneous:     Contractions are timed from the beginning " of one to the beginning of the next    Try hard to sleep during the early stage of labor when you are not that uncomfortable. Timing of contractions at this point is not important    Even if you cannot sleep, resting in bed or on the couch can help you maintain your energy for labor    When you arrive at the hospital the nurse will check your baby's heartbeat, check your cervix, and will call us. The midwife on call will come in and be with you when you are in active labor    After hours you need to enter the hospital through the emergency room

## 2021-04-26 NOTE — PROGRESS NOTES
S: Feels well,  Baby active.  Denies uterine cramping, vaginal bleeding or leaking of fluid. No headache, increase in edema, no epigastric pain.     Having some cold symptoms today, sore throat, congestion. 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 Recommended COVID-19 testing which she is going to complete today (has a test already at home).    O: Vitals: /68 (BP Location: Left arm, Cuff Size: Adult Regular)   Wt 83.9 kg (185 lb)   LMP 08/14/2020 (Exact Date)   BMI 29.41 kg/m    BMI= Body mass index is 29.41 kg/m .  Exam:  Constitutional: healthy, alert and no distress  Respiratory: respirations even and unlabored  Gastrointestinal: Abdomen soft, non-tender. Fundus measures appropriate for gestational age. Fetal heart tones hear without difficulty and within normal limits  : Normal external genitalia without lesions  SVE: Closed/th/-3  Cephalic per BSUS   Psychiatric: mentation appears normal and affect normal/bright    A/P:   (Z34.01) Encounter for supervision of normal first pregnancy in first trimester  (primary encounter diagnosis)  Labor signs and symptoms discussed, aware of numbers to call  Discussed warning signs of PIH/preeclampsia and patient will monitor.  GBS screen completed. Discussed plans for labor.  Encouraged patient to call with any questions or concerns.  Return to clinic 1 weeks    (O26.03) Excessive weight gain during pregnancy in third trimester  -TWG 55lbs  -Growth ultrasound normal at 35 weeks, infant measuring 81st%ile.  -FH measuring appropriately today, will continue to monitor       H. Sarah Blum CNM 4/26/2021 12:58 PM

## 2021-04-27 LAB
GP B STREP DNA SPEC QL NAA+PROBE: POSITIVE
SPECIMEN SOURCE: ABNORMAL

## 2021-05-03 ENCOUNTER — PRENATAL OFFICE VISIT (OUTPATIENT)
Dept: OBGYN | Facility: CLINIC | Age: 26
End: 2021-05-03
Payer: COMMERCIAL

## 2021-05-03 VITALS — BODY MASS INDEX: 29.73 KG/M2 | SYSTOLIC BLOOD PRESSURE: 112 MMHG | WEIGHT: 187 LBS | DIASTOLIC BLOOD PRESSURE: 70 MMHG

## 2021-05-03 DIAGNOSIS — Z34.01 ENCOUNTER FOR SUPERVISION OF NORMAL FIRST PREGNANCY IN FIRST TRIMESTER: Primary | ICD-10-CM

## 2021-05-03 PROCEDURE — 99207 PR PRENATAL VISIT: CPT | Performed by: ADVANCED PRACTICE MIDWIFE

## 2021-05-03 NOTE — PROGRESS NOTES
S: Feels well and has no concerns.  Baby active.  Denies uterine cramping, vaginal bleeding or leaking of fluid. No headache, increase in edema, no epigastric pain.   O: Vitals: /70 (BP Location: Left arm, Cuff Size: Adult Regular)   Wt 84.8 kg (187 lb)   LMP 08/14/2020 (Exact Date)   BMI 29.73 kg/m    BMI= Body mass index is 29.73 kg/m .  Exam:  Constitutional: healthy, alert and no distress  Respiratory: respirations even and unlabored  Gastrointestinal: Abdomen soft, non-tender. Fundus measures appropriate for gestational age. Fetal heart tones hear without difficulty and within normal limits  : Deferred  Psychiatric: mentation appears normal and affect normal/bright  A:     ICD-10-CM    1. Encounter for supervision of normal first pregnancy in first trimester  Z34.01      P: Labor signs and symptoms discussed, aware of numbers to call  Discussed warning signs of PIH/preeclampsia and patient will monitor.  GBS screen completed. Discussed plans for labor.   Encouraged patient to call with any questions or concerns.  Return to clinic 1 weeks    SIRIA Church, BRIANA

## 2021-05-03 NOTE — NURSING NOTE
"Chief Complaint   Patient presents with     Prenatal Care       Initial /70 (BP Location: Left arm, Cuff Size: Adult Regular)   Wt 84.8 kg (187 lb)   LMP 2020 (Exact Date)   BMI 29.73 kg/m   Estimated body mass index is 29.73 kg/m  as calculated from the following:    Height as of 3/29/21: 1.689 m (5' 6.5\").    Weight as of this encounter: 84.8 kg (187 lb).  BP completed using cuff size: regular    Questioned patient about current smoking habits.  Pt. has never smoked.                       "

## 2021-05-10 ENCOUNTER — PRENATAL OFFICE VISIT (OUTPATIENT)
Dept: OBGYN | Facility: CLINIC | Age: 26
End: 2021-05-10
Payer: COMMERCIAL

## 2021-05-10 VITALS — WEIGHT: 188 LBS | SYSTOLIC BLOOD PRESSURE: 108 MMHG | BODY MASS INDEX: 29.89 KG/M2 | DIASTOLIC BLOOD PRESSURE: 66 MMHG

## 2021-05-10 DIAGNOSIS — Z34.03 PRENATAL CARE, FIRST PREGNANCY IN THIRD TRIMESTER: Primary | ICD-10-CM

## 2021-05-10 DIAGNOSIS — O26.03 EXCESSIVE WEIGHT GAIN DURING PREGNANCY IN THIRD TRIMESTER: ICD-10-CM

## 2021-05-10 PROCEDURE — 99207 PR PRENATAL VISIT: CPT | Performed by: ADVANCED PRACTICE MIDWIFE

## 2021-05-10 NOTE — NURSING NOTE
"Chief Complaint   Patient presents with     Prenatal Care     38w 3d no concerns       Initial /66 (BP Location: Left arm, Cuff Size: Adult Regular)   Wt 85.3 kg (188 lb)   LMP 2020 (Exact Date)   BMI 29.89 kg/m   Estimated body mass index is 29.89 kg/m  as calculated from the following:    Height as of 3/29/21: 1.689 m (5' 6.5\").    Weight as of this encounter: 85.3 kg (188 lb).  BP completed using cuff size: regular    Questioned patient about current smoking habits.  Pt. quit smoking some time ago.          The following HM Due: NONE  Alivia Sterling CMA    "

## 2021-05-10 NOTE — PROGRESS NOTES
S: Feels well, no concerns. Denies uterine cramping, vaginal bleeding, leaking of fluid, headache, visual disturbances, or epigastric pain.  O: Vitals: /66 (BP Location: Left arm, Cuff Size: Adult Regular)   Wt 85.3 kg (188 lb)   LMP 08/14/2020 (Exact Date)   BMI 29.89 kg/m    BMI= Body mass index is 29.89 kg/m .  Exam:  Constitutional: Healthy, alert and no distress  Respiratory: Respirations even and unlabored  Gastrointestinal: Abdomen soft, non-tender. Fundus measures appropriate for gestational age. Fetal heart tones heard without difficulty and within normal limits Cephalic per Leopold's maneuver.   : Normal external genitalia without lesions; VE: FT/0%/-3/medium/posterior  Psychiatric: Mentation appears normal and affect normal/bright    A/P:  (Z34.03) Prenatal care, first pregnancy in third trimester  (primary encounter diagnosis)  - Discussed PROM vs SROM, labor signs/symptoms, aware of numbers to call  - Reviewed counting fetal movement  - Encouraged patient to call with any questions or concerns  - Return to clinic in 1 week    (O26.03) Excessive weight gain during pregnancy in third trimester  - TWG 58 lb  - Growth ultrasound at 35 weeks 81.9%ile; S=D today  - Reviewed healthy, nutrient-dense snacks, encouraged walks    Ana Hinson, MAGED, APRN, CNM

## 2021-05-12 ENCOUNTER — TELEPHONE (OUTPATIENT)
Dept: OBGYN | Facility: CLINIC | Age: 26
End: 2021-05-12

## 2021-05-12 NOTE — TELEPHONE ENCOUNTER
Patient was seen by Abby on 05/10/21- and given her AVS with the new contact number on it.     Laura Silverio CMA

## 2021-05-17 ENCOUNTER — PRENATAL OFFICE VISIT (OUTPATIENT)
Dept: OBGYN | Facility: CLINIC | Age: 26
End: 2021-05-17
Payer: COMMERCIAL

## 2021-05-17 VITALS
BODY MASS INDEX: 29.32 KG/M2 | HEIGHT: 67 IN | WEIGHT: 186.8 LBS | DIASTOLIC BLOOD PRESSURE: 60 MMHG | SYSTOLIC BLOOD PRESSURE: 120 MMHG

## 2021-05-17 DIAGNOSIS — O26.843 UTERINE SIZE-DATE DISCREPANCY IN THIRD TRIMESTER: ICD-10-CM

## 2021-05-17 DIAGNOSIS — Z34.01 ENCOUNTER FOR SUPERVISION OF NORMAL FIRST PREGNANCY IN FIRST TRIMESTER: Primary | ICD-10-CM

## 2021-05-17 PROBLEM — O26.03 EXCESSIVE WEIGHT GAIN DURING PREGNANCY IN THIRD TRIMESTER: Status: ACTIVE | Noted: 2020-11-23

## 2021-05-17 LAB — HGB BLD-MCNC: 11.8 G/DL (ref 11.7–15.7)

## 2021-05-17 PROCEDURE — 36415 COLL VENOUS BLD VENIPUNCTURE: CPT | Performed by: ADVANCED PRACTICE MIDWIFE

## 2021-05-17 PROCEDURE — 99207 PR PRENATAL VISIT: CPT | Performed by: ADVANCED PRACTICE MIDWIFE

## 2021-05-17 PROCEDURE — 85018 HEMOGLOBIN: CPT | Performed by: ADVANCED PRACTICE MIDWIFE

## 2021-05-17 ASSESSMENT — MIFFLIN-ST. JEOR: SCORE: 1607.01

## 2021-05-17 NOTE — PROGRESS NOTES
Tram Thomas presents to clinic alone at 39w3d for a routine prenatal appointment. She reports she is feeling well and has no concerns. Normal fetal movement. Denies bleeding, LOF, or regular, painful contractions. Denies headache / vision changes / RUQ pain.    Excessive weight gain: Reviewed total weight gain of 25.8 kg (56 lb 12.8 oz). Above range for expected total weight gain of 11.5 kg (25 lb)-16 kg (35 lb). Stable last 3 visits. Growth ultrasound 81st %ile.    COVID-19 Mitigation: Planning to vaccinate PP. Discussed hospital policies-- will have mom Mae present at [facility]. Discussed family vaccination, strategies for keeping baby safe.  Vertex confirmed by BSUS. Accepts repeat hgb check today. Pregnancy checklist updated. Desires IOL at 41 weeks if still pregnant-- reviewed scheduling at [facility], briefly discussed cervical ripening vs pitocin IOL. Warning signs reviewed. RTC in 1 week, sooner if problems.

## 2021-05-17 NOTE — NURSING NOTE
"39w3d    Chief Complaint   Patient presents with     Prenatal Care     had a small tummy ache this morning--discuss plan going forward       Initial /60   Ht 1.689 m (5' 6.5\")   Wt 84.7 kg (186 lb 12.8 oz)   LMP 2020 (Exact Date)   BMI 29.70 kg/m   Estimated body mass index is 29.7 kg/m  as calculated from the following:    Height as of this encounter: 1.689 m (5' 6.5\").    Weight as of this encounter: 84.7 kg (186 lb 12.8 oz).  BP completed using cuff size: regular    Questioned patient about current smoking habits.  Pt. quit smoking some time ago.          The following HM Due: NONE           "

## 2021-05-23 ENCOUNTER — ANESTHESIA EVENT (OUTPATIENT)
Dept: OBGYN | Facility: CLINIC | Age: 26
End: 2021-05-23
Payer: COMMERCIAL

## 2021-05-23 ENCOUNTER — ANESTHESIA (OUTPATIENT)
Dept: OBGYN | Facility: CLINIC | Age: 26
End: 2021-05-23
Payer: COMMERCIAL

## 2021-05-23 ENCOUNTER — NURSE TRIAGE (OUTPATIENT)
Dept: NURSING | Facility: CLINIC | Age: 26
End: 2021-05-23

## 2021-05-23 ENCOUNTER — HOSPITAL ENCOUNTER (INPATIENT)
Facility: CLINIC | Age: 26
LOS: 3 days | Discharge: HOME HEALTH | End: 2021-05-26
Attending: ADVANCED PRACTICE MIDWIFE | Admitting: ADVANCED PRACTICE MIDWIFE
Payer: COMMERCIAL

## 2021-05-23 ENCOUNTER — TELEPHONE (OUTPATIENT)
Dept: OBGYN | Facility: CLINIC | Age: 26
End: 2021-05-23

## 2021-05-23 DIAGNOSIS — L25.3 CHEMICAL DERMATITIS: ICD-10-CM

## 2021-05-23 PROBLEM — Z36.89 ENCOUNTER FOR TRIAGE IN PREGNANT PATIENT: Status: ACTIVE | Noted: 2021-05-23

## 2021-05-23 LAB
ALBUMIN UR-MCNC: NEGATIVE MG/DL
AMPHETAMINES UR QL SCN: NEGATIVE
APPEARANCE UR: CLEAR
BACTERIA #/AREA URNS HPF: ABNORMAL /HPF
BASOPHILS # BLD AUTO: 0.1 10E9/L (ref 0–0.2)
BASOPHILS NFR BLD AUTO: 0.8 %
BILIRUB UR QL STRIP: NEGATIVE
CANNABINOIDS UR QL: NEGATIVE
COCAINE UR QL: NEGATIVE
COLOR UR AUTO: ABNORMAL
DIFFERENTIAL METHOD BLD: ABNORMAL
EOSINOPHIL # BLD AUTO: 0 10E9/L (ref 0–0.7)
EOSINOPHIL NFR BLD AUTO: 0.3 %
ERYTHROCYTE [DISTWIDTH] IN BLOOD BY AUTOMATED COUNT: 12.9 % (ref 10–15)
FERN CRYSTALLIZATION: NEGATIVE
GLUCOSE UR STRIP-MCNC: NEGATIVE MG/DL
HCT VFR BLD AUTO: 38.6 % (ref 35–47)
HGB BLD-MCNC: 12.7 G/DL (ref 11.7–15.7)
HGB UR QL STRIP: ABNORMAL
IMM GRANULOCYTES # BLD: 0.5 10E9/L (ref 0–0.4)
IMM GRANULOCYTES NFR BLD: 3.6 %
KETONES UR STRIP-MCNC: NEGATIVE MG/DL
LABORATORY COMMENT REPORT: NORMAL
LEUKOCYTE ESTERASE UR QL STRIP: ABNORMAL
LYMPHOCYTES # BLD AUTO: 1.8 10E9/L (ref 0.8–5.3)
LYMPHOCYTES NFR BLD AUTO: 14 %
MCH RBC QN AUTO: 30.9 PG (ref 26.5–33)
MCHC RBC AUTO-ENTMCNC: 32.9 G/DL (ref 31.5–36.5)
MCV RBC AUTO: 94 FL (ref 78–100)
MONOCYTES # BLD AUTO: 0.7 10E9/L (ref 0–1.3)
MONOCYTES NFR BLD AUTO: 5.3 %
NEUTROPHILS # BLD AUTO: 10 10E9/L (ref 1.6–8.3)
NEUTROPHILS NFR BLD AUTO: 76 %
NITRATE UR QL: NEGATIVE
NRBC # BLD AUTO: 0 10*3/UL
NRBC BLD AUTO-RTO: 0 /100
OPIATES UR QL SCN: NEGATIVE
PCP UR QL SCN: NEGATIVE
PH UR STRIP: 7 PH (ref 5–7)
PLATELET # BLD AUTO: 167 10E9/L (ref 150–450)
RBC # BLD AUTO: 4.11 10E12/L (ref 3.8–5.2)
RBC #/AREA URNS AUTO: 1 /HPF (ref 0–2)
RUPTURE OF FETAL MEMBRANES BY ROM PLUS: POSITIVE
SARS-COV-2 RNA RESP QL NAA+PROBE: NEGATIVE
SOURCE: ABNORMAL
SP GR UR STRIP: 1 (ref 1–1.03)
SPECIMEN SOURCE: NORMAL
SQUAMOUS #/AREA URNS AUTO: 2 /HPF (ref 0–1)
UROBILINOGEN UR STRIP-MCNC: NORMAL MG/DL (ref 0–2)
WBC # BLD AUTO: 13.1 10E9/L (ref 4–11)
WBC #/AREA URNS AUTO: 9 /HPF (ref 0–5)

## 2021-05-23 PROCEDURE — 86780 TREPONEMA PALLIDUM: CPT | Performed by: ADVANCED PRACTICE MIDWIFE

## 2021-05-23 PROCEDURE — 87635 SARS-COV-2 COVID-19 AMP PRB: CPT | Performed by: ADVANCED PRACTICE MIDWIFE

## 2021-05-23 PROCEDURE — 250N000009 HC RX 250: Performed by: ADVANCED PRACTICE MIDWIFE

## 2021-05-23 PROCEDURE — 81001 URINALYSIS AUTO W/SCOPE: CPT | Performed by: ADVANCED PRACTICE MIDWIFE

## 2021-05-23 PROCEDURE — 84112 EVAL AMNIOTIC FLUID PROTEIN: CPT | Performed by: ADVANCED PRACTICE MIDWIFE

## 2021-05-23 PROCEDURE — 85025 COMPLETE CBC W/AUTO DIFF WBC: CPT | Performed by: ADVANCED PRACTICE MIDWIFE

## 2021-05-23 PROCEDURE — 10907ZC DRAINAGE OF AMNIOTIC FLUID, THERAPEUTIC FROM PRODUCTS OF CONCEPTION, VIA NATURAL OR ARTIFICIAL OPENING: ICD-10-PCS | Performed by: OBSTETRICS & GYNECOLOGY

## 2021-05-23 PROCEDURE — 87086 URINE CULTURE/COLONY COUNT: CPT | Performed by: ADVANCED PRACTICE MIDWIFE

## 2021-05-23 PROCEDURE — 80307 DRUG TEST PRSMV CHEM ANLYZR: CPT | Performed by: ADVANCED PRACTICE MIDWIFE

## 2021-05-23 PROCEDURE — 86901 BLOOD TYPING SEROLOGIC RH(D): CPT | Performed by: ADVANCED PRACTICE MIDWIFE

## 2021-05-23 PROCEDURE — G0463 HOSPITAL OUTPT CLINIC VISIT: HCPCS

## 2021-05-23 PROCEDURE — 86900 BLOOD TYPING SEROLOGIC ABO: CPT | Performed by: ADVANCED PRACTICE MIDWIFE

## 2021-05-23 PROCEDURE — 120N000001 HC R&B MED SURG/OB

## 2021-05-23 PROCEDURE — 250N000011 HC RX IP 250 OP 636: Performed by: ADVANCED PRACTICE MIDWIFE

## 2021-05-23 PROCEDURE — 258N000003 HC RX IP 258 OP 636: Performed by: ADVANCED PRACTICE MIDWIFE

## 2021-05-23 RX ORDER — CARBOPROST TROMETHAMINE 250 UG/ML
250 INJECTION, SOLUTION INTRAMUSCULAR
Status: DISCONTINUED | OUTPATIENT
Start: 2021-05-23 | End: 2021-05-24

## 2021-05-23 RX ORDER — OXYTOCIN/0.9 % SODIUM CHLORIDE 30/500 ML
100-340 PLASTIC BAG, INJECTION (ML) INTRAVENOUS CONTINUOUS PRN
Status: DISCONTINUED | OUTPATIENT
Start: 2021-05-23 | End: 2021-05-24

## 2021-05-23 RX ORDER — NALOXONE HYDROCHLORIDE 0.4 MG/ML
0.4 INJECTION, SOLUTION INTRAMUSCULAR; INTRAVENOUS; SUBCUTANEOUS
Status: DISCONTINUED | OUTPATIENT
Start: 2021-05-23 | End: 2021-05-24

## 2021-05-23 RX ORDER — NALOXONE HYDROCHLORIDE 0.4 MG/ML
0.2 INJECTION, SOLUTION INTRAMUSCULAR; INTRAVENOUS; SUBCUTANEOUS
Status: DISCONTINUED | OUTPATIENT
Start: 2021-05-23 | End: 2021-05-24

## 2021-05-23 RX ORDER — NALBUPHINE HYDROCHLORIDE 10 MG/ML
2.5-5 INJECTION, SOLUTION INTRAMUSCULAR; INTRAVENOUS; SUBCUTANEOUS EVERY 6 HOURS PRN
Status: DISCONTINUED | OUTPATIENT
Start: 2021-05-23 | End: 2021-05-26 | Stop reason: HOSPADM

## 2021-05-23 RX ORDER — PENICILLIN G POTASSIUM 5000000 [IU]/1
5 INJECTION, POWDER, FOR SOLUTION INTRAMUSCULAR; INTRAVENOUS ONCE
Status: COMPLETED | OUTPATIENT
Start: 2021-05-23 | End: 2021-05-23

## 2021-05-23 RX ORDER — OXYTOCIN/0.9 % SODIUM CHLORIDE 30/500 ML
1-24 PLASTIC BAG, INJECTION (ML) INTRAVENOUS CONTINUOUS
Status: DISCONTINUED | OUTPATIENT
Start: 2021-05-23 | End: 2021-05-24

## 2021-05-23 RX ORDER — ONDANSETRON 2 MG/ML
4 INJECTION INTRAMUSCULAR; INTRAVENOUS EVERY 6 HOURS PRN
Status: DISCONTINUED | OUTPATIENT
Start: 2021-05-23 | End: 2021-05-24

## 2021-05-23 RX ORDER — ACETAMINOPHEN 325 MG/1
650 TABLET ORAL EVERY 4 HOURS PRN
Status: DISCONTINUED | OUTPATIENT
Start: 2021-05-23 | End: 2021-05-24

## 2021-05-23 RX ORDER — EPHEDRINE SULFATE 50 MG/ML
5 INJECTION, SOLUTION INTRAMUSCULAR; INTRAVENOUS; SUBCUTANEOUS
Status: DISCONTINUED | OUTPATIENT
Start: 2021-05-23 | End: 2021-05-24

## 2021-05-23 RX ORDER — FENTANYL CITRATE 50 UG/ML
50-100 INJECTION, SOLUTION INTRAMUSCULAR; INTRAVENOUS
Status: DISCONTINUED | OUTPATIENT
Start: 2021-05-23 | End: 2021-05-24

## 2021-05-23 RX ORDER — SODIUM CHLORIDE, SODIUM LACTATE, POTASSIUM CHLORIDE, CALCIUM CHLORIDE 600; 310; 30; 20 MG/100ML; MG/100ML; MG/100ML; MG/100ML
INJECTION, SOLUTION INTRAVENOUS CONTINUOUS
Status: DISCONTINUED | OUTPATIENT
Start: 2021-05-23 | End: 2021-05-24

## 2021-05-23 RX ORDER — LIDOCAINE 40 MG/G
CREAM TOPICAL
Status: DISCONTINUED | OUTPATIENT
Start: 2021-05-23 | End: 2021-05-24

## 2021-05-23 RX ORDER — IBUPROFEN 800 MG/1
800 TABLET, FILM COATED ORAL
Status: DISCONTINUED | OUTPATIENT
Start: 2021-05-23 | End: 2021-05-24

## 2021-05-23 RX ORDER — OXYTOCIN 10 [USP'U]/ML
10 INJECTION, SOLUTION INTRAMUSCULAR; INTRAVENOUS
Status: DISCONTINUED | OUTPATIENT
Start: 2021-05-23 | End: 2021-05-24

## 2021-05-23 RX ORDER — TRANEXAMIC ACID 10 MG/ML
1 INJECTION, SOLUTION INTRAVENOUS EVERY 30 MIN PRN
Status: DISCONTINUED | OUTPATIENT
Start: 2021-05-23 | End: 2021-05-24

## 2021-05-23 RX ORDER — METHYLERGONOVINE MALEATE 0.2 MG/ML
200 INJECTION INTRAVENOUS
Status: DISCONTINUED | OUTPATIENT
Start: 2021-05-23 | End: 2021-05-24

## 2021-05-23 RX ADMIN — PENICILLIN G POTASSIUM 5 MILLION UNITS: 5000000 POWDER, FOR SOLUTION INTRAMUSCULAR; INTRAPLEURAL; INTRATHECAL; INTRAVENOUS at 20:43

## 2021-05-23 RX ADMIN — Medication 2 MILLI-UNITS/MIN: at 12:12

## 2021-05-23 RX ADMIN — SODIUM CHLORIDE, POTASSIUM CHLORIDE, SODIUM LACTATE AND CALCIUM CHLORIDE: 600; 310; 30; 20 INJECTION, SOLUTION INTRAVENOUS at 19:13

## 2021-05-23 RX ADMIN — SODIUM CHLORIDE, POTASSIUM CHLORIDE, SODIUM LACTATE AND CALCIUM CHLORIDE: 600; 310; 30; 20 INJECTION, SOLUTION INTRAVENOUS at 12:13

## 2021-05-23 ASSESSMENT — MIFFLIN-ST. JEOR: SCORE: 1585.24

## 2021-05-23 NOTE — H&P
CNM Labor Admission History & Physical    Tram Thomas is a 26 year old  with an IUP at 40w2d  ; ,   Partner/support Person: Boris  Language Barrier: English  Clinic: [facility]  Provider: Valentina      History of present inllness/Chief Complaint:    Tram Thomas is admitted to the Birthplace at [facility] on 2021 at 11:40 AM   Tram reports that yesterday around 1700 she noticed that she started leaking clear fluid vaginally. She states that she continued leaking throughout the night, and lost her mucus plug as well. This morning, she continued to leak clear, odorless fluid that was filling a pad, so called the hospital and was instructed to present to LD for evaluation.       Here with: spontaneous rupture of membranes  Patient reports contractions are None      Baby active Yes  Membranes are ruptured since  1700 and verified with positive rom plus   Bloody show No   Any changes with medical history since last prenatal visit No    Obstetrical history  Estimated Date of Delivery: May 21, 2021 determined by LMP and confirmed with 8wk US  Patient's last menstrual period was 2020 (exact date).   Dating U/S: 10/12/2020    Fetal anatomic survey: Normal  Placenta: posterior     PRENATAL COURSE  Prenatal care began at 9w wks gestation for a total of 12 prenatal visits.  Total wt gain 52lbs; Body mass index is 28.94 kg/m .  Prenatal Blood Pressure: WNL  Prenatal course was essentially uncomplicated  Tdap: done  Rhogam: not indicated     Patient Active Problem List   Diagnosis     Allergic conjunctivitis, bilateral     Encounter for supervision of normal first pregnancy in first trimester     Excessive weight gain during pregnancy in third trimester     Attention deficit disorder     Food poisoning     Uterine size-date discrepancy in third trimester     Encounter for triage in pregnant patient     Indication for care in labor or delivery       HISTORY  Allergies   Allergen  "Reactions     Morphine      As a child     Past Medical History:   Diagnosis Date     Chronic UTI      Past Surgical History:   Procedure Laterality Date     C URETER ENDOSCOPY THRU URETEROSTOMY  2005     FEMUR SURGERY Right     sledding accident fracture     GENITOURINARY SURGERY  2005     ORTHOPEDIC SURGERY Right 2002    broken femur, had surgery.     Family History   Problem Relation Age of Onset     Hypertension Mother      Depression Mother      No Known Problems Father      No Known Problems Sister      No Known Problems Brother      No Known Problems Sister      Social History     Tobacco Use     Smoking status: Former Smoker     Smokeless tobacco: Never Used   Substance Use Topics     Alcohol use: Not Currently     Comment: socially     OB History    Para Term  AB Living   1 0 0 0 0 0   SAB TAB Ectopic Multiple Live Births   0 0 0 0 0      # Outcome Date GA Lbr Dylan/2nd Weight Sex Delivery Anes PTL Lv   1 Current                LABS:  Lab Results   Component Value Date    ABO O 10/12/2020    RH Pos 10/12/2020    AS Neg 10/12/2020    HGB 11.8 2021    HEPBANG Nonreactive 10/12/2020       GBS Status:   Lab Results   Component Value Date    GBS Positive (A) 2021     Rubella: Immune    HIV: Non-Reactive   Platelets:  194  1hr GCT:  98    ROS   Pt is alert and oriented  Pt denies significant constitutional symptoms including fever and/or malaise.    Pt denies significant respiratory, cardiovacular, GI, or muscular/skeletal complaints.    Neuro: Denies HA and visual changes  Muscoloskeletal: Denies except for discomforts r/t pregnancy     PHYSICAL EXAM:  /75   Temp 97.5  F (36.4  C) (Oral)   Resp 16   Ht 1.689 m (5' 6.5\")   Wt 82.6 kg (182 lb)   LMP 2020 (Exact Date)   BMI 28.94 kg/m    General appearance:  healthy, alert, active, no distress and smiling   Heart: RRR  Lungs: CTA bilaterally, normal respiratory effort  Abdomen: gravid, single vertex fetus, " non-tender, EFW 8 lbs.   Legs: reflexes 2+ bilaterally, no clonus, no edema     Contractions: Pt is devon in a rare pattern    Fetal heart tones: Baseline 130s   Variability: moderate   Accelerations: present  Decelerations: absent    NST: reactive    Cervix: 1/ 30%/ Mid/ average/ -2, Vtx per RN  Bloody show: no  Membranes:  Ruptured   ROM+positive  Fern negative    ASSESSMENT:  26 year old  with shanks IUP 40w2d ruptured with no labor  NST reactive  GBS positive and membranes ruptured for 19 hours  Excess weight gain in pregnancy - EFW 81% at 35wks      PLAN:  Routine CNM care  Labs ordered: Hemoglobin and type and screen  Teaching done r/t comfort measures, pain management options, and labor processes, and pt is open to an epidural     Discussed positive rom+ test as well as negative fern. Tram's story is convincing for rupture, and she continues to leak clear fluid after admission. Discussed that as membranes have likely been ruptured now for 19hrs without labor starting on its own, I would recommend induction of labor. She and Boris agree with induction for prelabor rupture of membranes.     PCN for GBS prophylaxis when in active labor  Limit cervical exams  Admit - see IP orders  Labor induction with Pitocin  MD consultant on call Parmjit/ available prn    Bianka Hilton CNM

## 2021-05-23 NOTE — PROVIDER NOTIFICATION
05/23/21 1045   Provider Notification   Provider Name/Title BRIANA Carlton   Method of Notification Phone   Request Evaluate - Remote   Notification Reason Patient Arrived;Uterine Activity;Lab/Diagnostic Study;SVE     CNM updated on patient arrival. ROM+ was positive, Fern is not resulted yet.  SVE: 1 cm, 30%, -2 station.  Patient denies feeling any contractions.  VSS.  Orders to put in intrapartum order set, hold on the penicillin until the patient is in active labor, and start pitocin.  CNM will to on the unit within 30 minutes.

## 2021-05-23 NOTE — PLAN OF CARE
Bianka WARREN at bedside to discuss POC. Bedside ultrasound confirmed vertex presentation. Will start pitocin augmentation. Hold PCN treatment for GBS+ until patient is in active labor. Discussed pain management plans. Unfortunately patient's mother is not hold  certification so she will not be able to be present at this birth. covid results pending.    Patient informed of need for urine tox due to THC use prior to pregnancy.  Verbal consent obtained and maternal urine sent. Toxicology results negative. Umbilical cord segment will be sent for toxicology.

## 2021-05-23 NOTE — TELEPHONE ENCOUNTER
Pt due date Friday 5/21/21, pt states vaginal discharge last evening, thin fluid.  This morning underwear wet and fluid continues.  No urgency to push, pt states not in labor.  On Call Midwife paged to pt and instructed pt if she does not hear back from Midwife in 15 min to call back.  Ana Das RN, MA  [place] Nurse Advisor      Reason for Disposition    Leakage of fluid from vagina    Protocols used: PREGNANCY - RUPTURE OF [id number]

## 2021-05-23 NOTE — PLAN OF CARE
Data: Patient presented to Birthplace: 2021  9:53 AM.  Reason for maternal/fetal assessment is leaking vaginal fluid. Patient reports she believes that her water broke last evening around 1800, clear fluid, no odor.  It has been a scant amount of fluid since last evening.  Patient is a .  Prenatal record reviewed. Pregnancy  has been uncomplicated.  Gestational Age 40w2d. VSS. Fetal movement active. Patient denies uterine contractions, vaginal bleeding, abdominal pain, nausea, vomiting, headache, visual disturbances, epigastric or URQ pain, significant edema. Support person is present. Fern, ROM+, urinalysis, urine tox, and SVE completed  Action: Verbal consent for EFM. Triage assessment completed. Bill of rights reviewed.  Response: Patient verbalized agreement with plan. Will contact BRIANA Hilton with update and for further orders.

## 2021-05-23 NOTE — PROGRESS NOTES
"CNM PROGRESS NOTE    SUBJECTIVE:  Tram is resting comfortably on her side. Boris is at the bedside.   She reports she is feeling more uncomfortable with contractions and describes them as strong period cramps, however they are still manageable. She is able to talk easily through her contractions.       OBJECTIVE:  /68   Temp 97.6  F (36.4  C) (Axillary)   Resp 16   Ht 1.689 m (5' 6.5\")   Wt 82.6 kg (182 lb)   LMP 08/14/2020 (Exact Date)   BMI 28.94 kg/m      Fetal heart tones: Baseline 120s   Variability: moderate   Accelerations: present  Decelerations: absent    Contractions: Pt is devon every 1-4 minutes, lasting 40-70 seconds and palpates mild with Shannon    Cervix: deferred  ROM: clear fluid    Pitocin- 10 mu/min   Antibiotics- none currently   Cervical ripening: N/A    ASSESSMENT:  IUP @ 40w2d IOL for prelabor rupture of membranes    GBS- positive  Reassuring fetal status   PROM - membranes ruptured x24hrs, afebrile, no maternal/fetal tachycardia   Pitocin x5.5 hrs     PLAN:   PCN for GBS prophylaxis when in active labor  Limit cervical exams  Discussed pain relief options including nitrous, IV pain meds, and epidural. Tram is considering nitrous when she is more uncomfortable, then likely an epidural at some point.   Plan to reassess cervix when she is more uncomfortable and consider rupturing forebag if present    Reassess in 4-6hrs/prn      Bianka Hilton CNM    "

## 2021-05-23 NOTE — TELEPHONE ENCOUNTER
Called pt at approximately 0910 at request of answering service. Tram reports leaking thin, odorless, watery discharge since yesterday late afternoon. Denies bleeding, contractions, or decreased fetal movement. Advised to present to LD for assessment to rule out rupture of membranes. She and her  plan to head over now. LD advised of her arrival.     SIRIA Velásquez, BRIANA

## 2021-05-24 PROBLEM — Z98.891 HISTORY OF C-SECTION: Status: ACTIVE | Noted: 2021-05-24

## 2021-05-24 PROBLEM — Z36.89 ENCOUNTER FOR TRIAGE IN PREGNANT PATIENT: Status: RESOLVED | Noted: 2021-05-23 | Resolved: 2021-05-24

## 2021-05-24 LAB
ABO + RH BLD: NORMAL
BACTERIA SPEC CULT: NORMAL
BLD GP AB SCN SERPL QL: NORMAL
BLOOD BANK CMNT PATIENT-IMP: NORMAL
Lab: NORMAL
SPECIMEN EXP DATE BLD: NORMAL
SPECIMEN EXP DATE BLD: NORMAL
SPECIMEN SOURCE: NORMAL
T PALLIDUM AB SER QL: NONREACTIVE

## 2021-05-24 PROCEDURE — 258N000003 HC RX IP 258 OP 636: Performed by: NURSE ANESTHETIST, CERTIFIED REGISTERED

## 2021-05-24 PROCEDURE — 250N000011 HC RX IP 250 OP 636: Performed by: OBSTETRICS & GYNECOLOGY

## 2021-05-24 PROCEDURE — 250N000011 HC RX IP 250 OP 636: Performed by: ANESTHESIOLOGY

## 2021-05-24 PROCEDURE — 250N000011 HC RX IP 250 OP 636: Performed by: ADVANCED PRACTICE MIDWIFE

## 2021-05-24 PROCEDURE — 86850 RBC ANTIBODY SCREEN: CPT | Performed by: ADVANCED PRACTICE MIDWIFE

## 2021-05-24 PROCEDURE — 250N000013 HC RX MED GY IP 250 OP 250 PS 637: Performed by: OBSTETRICS & GYNECOLOGY

## 2021-05-24 PROCEDURE — 250N000013 HC RX MED GY IP 250 OP 250 PS 637: Performed by: ADVANCED PRACTICE MIDWIFE

## 2021-05-24 PROCEDURE — 59510 CESAREAN DELIVERY: CPT | Performed by: OBSTETRICS & GYNECOLOGY

## 2021-05-24 PROCEDURE — 272N000001 HC OR GENERAL SUPPLY STERILE: Performed by: OBSTETRICS & GYNECOLOGY

## 2021-05-24 PROCEDURE — 710N000009 HC RECOVERY PHASE 1, LEVEL 1, PER MIN: Performed by: OBSTETRICS & GYNECOLOGY

## 2021-05-24 PROCEDURE — 250N000009 HC RX 250: Performed by: NURSE ANESTHETIST, CERTIFIED REGISTERED

## 2021-05-24 PROCEDURE — 250N000009 HC RX 250: Performed by: ANESTHESIOLOGY

## 2021-05-24 PROCEDURE — 258N000003 HC RX IP 258 OP 636: Performed by: ADVANCED PRACTICE MIDWIFE

## 2021-05-24 PROCEDURE — 360N000076 HC SURGERY LEVEL 3, PER MIN: Performed by: OBSTETRICS & GYNECOLOGY

## 2021-05-24 PROCEDURE — 250N000009 HC RX 250: Performed by: ADVANCED PRACTICE MIDWIFE

## 2021-05-24 PROCEDURE — 250N000011 HC RX IP 250 OP 636: Performed by: NURSE ANESTHETIST, CERTIFIED REGISTERED

## 2021-05-24 PROCEDURE — 00HU33Z INSERTION OF INFUSION DEVICE INTO SPINAL CANAL, PERCUTANEOUS APPROACH: ICD-10-PCS | Performed by: ANESTHESIOLOGY

## 2021-05-24 PROCEDURE — 86901 BLOOD TYPING SEROLOGIC RH(D): CPT | Performed by: ADVANCED PRACTICE MIDWIFE

## 2021-05-24 PROCEDURE — 3E0R3BZ INTRODUCTION OF ANESTHETIC AGENT INTO SPINAL CANAL, PERCUTANEOUS APPROACH: ICD-10-PCS | Performed by: ANESTHESIOLOGY

## 2021-05-24 PROCEDURE — 370N000017 HC ANESTHESIA TECHNICAL FEE, PER MIN: Performed by: OBSTETRICS & GYNECOLOGY

## 2021-05-24 PROCEDURE — 120N000001 HC R&B MED SURG/OB

## 2021-05-24 PROCEDURE — 36415 COLL VENOUS BLD VENIPUNCTURE: CPT | Performed by: ADVANCED PRACTICE MIDWIFE

## 2021-05-24 PROCEDURE — 86900 BLOOD TYPING SEROLOGIC ABO: CPT | Performed by: ADVANCED PRACTICE MIDWIFE

## 2021-05-24 RX ORDER — CEFAZOLIN SODIUM 2 G/100ML
2 INJECTION, SOLUTION INTRAVENOUS
Status: COMPLETED | OUTPATIENT
Start: 2021-05-24 | End: 2021-05-24

## 2021-05-24 RX ORDER — DIPHENHYDRAMINE HCL 25 MG
25 CAPSULE ORAL EVERY 6 HOURS PRN
Status: DISCONTINUED | OUTPATIENT
Start: 2021-05-24 | End: 2021-05-26 | Stop reason: HOSPADM

## 2021-05-24 RX ORDER — ACETAMINOPHEN 325 MG/1
975 TABLET ORAL EVERY 6 HOURS
Status: DISCONTINUED | OUTPATIENT
Start: 2021-05-24 | End: 2021-05-26 | Stop reason: HOSPADM

## 2021-05-24 RX ORDER — AZITHROMYCIN 500 MG/5ML
500 INJECTION, POWDER, LYOPHILIZED, FOR SOLUTION INTRAVENOUS
Status: COMPLETED | OUTPATIENT
Start: 2021-05-24 | End: 2021-05-24

## 2021-05-24 RX ORDER — SIMETHICONE 80 MG
80 TABLET,CHEWABLE ORAL 4 TIMES DAILY PRN
Status: DISCONTINUED | OUTPATIENT
Start: 2021-05-24 | End: 2021-05-26 | Stop reason: HOSPADM

## 2021-05-24 RX ORDER — ONDANSETRON 2 MG/ML
4 INJECTION INTRAMUSCULAR; INTRAVENOUS EVERY 6 HOURS PRN
Status: DISCONTINUED | OUTPATIENT
Start: 2021-05-24 | End: 2021-05-26 | Stop reason: HOSPADM

## 2021-05-24 RX ORDER — OXYTOCIN/0.9 % SODIUM CHLORIDE 30/500 ML
PLASTIC BAG, INJECTION (ML) INTRAVENOUS PRN
Status: DISCONTINUED | OUTPATIENT
Start: 2021-05-24 | End: 2021-05-24

## 2021-05-24 RX ORDER — NALOXONE HYDROCHLORIDE 0.4 MG/ML
0.2 INJECTION, SOLUTION INTRAMUSCULAR; INTRAVENOUS; SUBCUTANEOUS
Status: DISCONTINUED | OUTPATIENT
Start: 2021-05-24 | End: 2021-05-26 | Stop reason: HOSPADM

## 2021-05-24 RX ORDER — IBUPROFEN 800 MG/1
800 TABLET, FILM COATED ORAL EVERY 6 HOURS
Status: DISCONTINUED | OUTPATIENT
Start: 2021-05-25 | End: 2021-05-26 | Stop reason: HOSPADM

## 2021-05-24 RX ORDER — BISACODYL 10 MG
10 SUPPOSITORY, RECTAL RECTAL DAILY PRN
Status: DISCONTINUED | OUTPATIENT
Start: 2021-05-26 | End: 2021-05-26 | Stop reason: HOSPADM

## 2021-05-24 RX ORDER — HYDROCORTISONE 2.5 %
CREAM (GRAM) TOPICAL 3 TIMES DAILY PRN
Status: DISCONTINUED | OUTPATIENT
Start: 2021-05-24 | End: 2021-05-26 | Stop reason: HOSPADM

## 2021-05-24 RX ORDER — DEXTROSE, SODIUM CHLORIDE, SODIUM LACTATE, POTASSIUM CHLORIDE, AND CALCIUM CHLORIDE 5; .6; .31; .03; .02 G/100ML; G/100ML; G/100ML; G/100ML; G/100ML
INJECTION, SOLUTION INTRAVENOUS CONTINUOUS
Status: DISCONTINUED | OUTPATIENT
Start: 2021-05-24 | End: 2021-05-26 | Stop reason: HOSPADM

## 2021-05-24 RX ORDER — KETOROLAC TROMETHAMINE 30 MG/ML
30 INJECTION, SOLUTION INTRAMUSCULAR; INTRAVENOUS EVERY 6 HOURS
Status: COMPLETED | OUTPATIENT
Start: 2021-05-24 | End: 2021-05-25

## 2021-05-24 RX ORDER — OXYTOCIN/0.9 % SODIUM CHLORIDE 30/500 ML
340 PLASTIC BAG, INJECTION (ML) INTRAVENOUS CONTINUOUS PRN
Status: DISCONTINUED | OUTPATIENT
Start: 2021-05-24 | End: 2021-05-26 | Stop reason: HOSPADM

## 2021-05-24 RX ORDER — KETOROLAC TROMETHAMINE 30 MG/ML
INJECTION, SOLUTION INTRAMUSCULAR; INTRAVENOUS PRN
Status: DISCONTINUED | OUTPATIENT
Start: 2021-05-24 | End: 2021-05-24

## 2021-05-24 RX ORDER — OXYCODONE HYDROCHLORIDE 5 MG/1
5 TABLET ORAL EVERY 4 HOURS PRN
Status: DISCONTINUED | OUTPATIENT
Start: 2021-05-24 | End: 2021-05-26 | Stop reason: HOSPADM

## 2021-05-24 RX ORDER — OXYTOCIN 10 [USP'U]/ML
10 INJECTION, SOLUTION INTRAMUSCULAR; INTRAVENOUS
Status: DISCONTINUED | OUTPATIENT
Start: 2021-05-24 | End: 2021-05-26 | Stop reason: HOSPADM

## 2021-05-24 RX ORDER — CEFAZOLIN SODIUM 1 G/3ML
1 INJECTION, POWDER, FOR SOLUTION INTRAMUSCULAR; INTRAVENOUS SEE ADMIN INSTRUCTIONS
Status: DISCONTINUED | OUTPATIENT
Start: 2021-05-24 | End: 2021-05-24 | Stop reason: HOSPADM

## 2021-05-24 RX ORDER — ONDANSETRON 2 MG/ML
INJECTION INTRAMUSCULAR; INTRAVENOUS PRN
Status: DISCONTINUED | OUTPATIENT
Start: 2021-05-24 | End: 2021-05-24

## 2021-05-24 RX ORDER — TRANEXAMIC ACID 10 MG/ML
1 INJECTION, SOLUTION INTRAVENOUS EVERY 30 MIN PRN
Status: DISCONTINUED | OUTPATIENT
Start: 2021-05-24 | End: 2021-05-26 | Stop reason: HOSPADM

## 2021-05-24 RX ORDER — CITRIC ACID/SODIUM CITRATE 334-500MG
30 SOLUTION, ORAL ORAL
Status: COMPLETED | OUTPATIENT
Start: 2021-05-24 | End: 2021-05-24

## 2021-05-24 RX ORDER — NALOXONE HYDROCHLORIDE 0.4 MG/ML
0.4 INJECTION, SOLUTION INTRAMUSCULAR; INTRAVENOUS; SUBCUTANEOUS
Status: DISCONTINUED | OUTPATIENT
Start: 2021-05-24 | End: 2021-05-26 | Stop reason: HOSPADM

## 2021-05-24 RX ORDER — OXYTOCIN/0.9 % SODIUM CHLORIDE 30/500 ML
100 PLASTIC BAG, INJECTION (ML) INTRAVENOUS CONTINUOUS
Status: DISCONTINUED | OUTPATIENT
Start: 2021-05-24 | End: 2021-05-26 | Stop reason: HOSPADM

## 2021-05-24 RX ORDER — FENTANYL CITRATE 50 UG/ML
INJECTION, SOLUTION INTRAMUSCULAR; INTRAVENOUS PRN
Status: DISCONTINUED | OUTPATIENT
Start: 2021-05-24 | End: 2021-05-24

## 2021-05-24 RX ORDER — DIPHENHYDRAMINE HYDROCHLORIDE 50 MG/ML
25 INJECTION INTRAMUSCULAR; INTRAVENOUS EVERY 6 HOURS PRN
Status: DISCONTINUED | OUTPATIENT
Start: 2021-05-24 | End: 2021-05-26 | Stop reason: HOSPADM

## 2021-05-24 RX ORDER — LIDOCAINE HCL/EPINEPHRINE/PF 2%-1:200K
VIAL (ML) INJECTION PRN
Status: DISCONTINUED | OUTPATIENT
Start: 2021-05-24 | End: 2021-05-24

## 2021-05-24 RX ORDER — AMOXICILLIN 250 MG
2 CAPSULE ORAL 2 TIMES DAILY
Status: DISCONTINUED | OUTPATIENT
Start: 2021-05-24 | End: 2021-05-26 | Stop reason: HOSPADM

## 2021-05-24 RX ORDER — LIDOCAINE 40 MG/G
CREAM TOPICAL
Status: DISCONTINUED | OUTPATIENT
Start: 2021-05-24 | End: 2021-05-26 | Stop reason: HOSPADM

## 2021-05-24 RX ORDER — SODIUM CHLORIDE, SODIUM LACTATE, POTASSIUM CHLORIDE, CALCIUM CHLORIDE 600; 310; 30; 20 MG/100ML; MG/100ML; MG/100ML; MG/100ML
INJECTION, SOLUTION INTRAVENOUS CONTINUOUS
Status: DISCONTINUED | OUTPATIENT
Start: 2021-05-24 | End: 2021-05-24 | Stop reason: HOSPADM

## 2021-05-24 RX ORDER — AMOXICILLIN 250 MG
1 CAPSULE ORAL 2 TIMES DAILY
Status: DISCONTINUED | OUTPATIENT
Start: 2021-05-24 | End: 2021-05-26 | Stop reason: HOSPADM

## 2021-05-24 RX ORDER — MODIFIED LANOLIN
OINTMENT (GRAM) TOPICAL
Status: DISCONTINUED | OUTPATIENT
Start: 2021-05-24 | End: 2021-05-26 | Stop reason: HOSPADM

## 2021-05-24 RX ADMIN — SODIUM CHLORIDE, POTASSIUM CHLORIDE, SODIUM LACTATE AND CALCIUM CHLORIDE: 600; 310; 30; 20 INJECTION, SOLUTION INTRAVENOUS at 10:24

## 2021-05-24 RX ADMIN — SODIUM CHLORIDE 2.5 MILLION UNITS: 9 INJECTION, SOLUTION INTRAVENOUS at 08:47

## 2021-05-24 RX ADMIN — SODIUM CITRATE AND CITRIC ACID MONOHYDRATE 30 ML: 500; 334 SOLUTION ORAL at 10:22

## 2021-05-24 RX ADMIN — Medication 10 ML/HR: at 01:23

## 2021-05-24 RX ADMIN — SODIUM CHLORIDE, POTASSIUM CHLORIDE, SODIUM LACTATE AND CALCIUM CHLORIDE: 600; 310; 30; 20 INJECTION, SOLUTION INTRAVENOUS at 08:24

## 2021-05-24 RX ADMIN — OXYCODONE HYDROCHLORIDE 5 MG: 5 TABLET ORAL at 18:55

## 2021-05-24 RX ADMIN — DOCUSATE SODIUM 50 MG AND SENNOSIDES 8.6 MG 1 TABLET: 8.6; 5 TABLET, FILM COATED ORAL at 22:06

## 2021-05-24 RX ADMIN — OXYCODONE HYDROCHLORIDE 5 MG: 5 TABLET ORAL at 23:19

## 2021-05-24 RX ADMIN — SODIUM CHLORIDE 2.5 MILLION UNITS: 9 INJECTION, SOLUTION INTRAVENOUS at 04:46

## 2021-05-24 RX ADMIN — Medication 5 MG: at 02:52

## 2021-05-24 RX ADMIN — SODIUM CHLORIDE, SODIUM LACTATE, POTASSIUM CHLORIDE, CALCIUM CHLORIDE AND DEXTROSE MONOHYDRATE: 5; 600; 310; 30; 20 INJECTION, SOLUTION INTRAVENOUS at 15:18

## 2021-05-24 RX ADMIN — FENTANYL CITRATE 100 MCG: 50 INJECTION, SOLUTION INTRAMUSCULAR; INTRAVENOUS at 11:14

## 2021-05-24 RX ADMIN — OXYTOCIN-SODIUM CHLORIDE 0.9% IV SOLN 30 UNIT/500ML 99 ML: 30-0.9/5 SOLUTION at 11:12

## 2021-05-24 RX ADMIN — SODIUM CHLORIDE 2.5 MILLION UNITS: 9 INJECTION, SOLUTION INTRAVENOUS at 00:43

## 2021-05-24 RX ADMIN — Medication 5 MG: at 01:45

## 2021-05-24 RX ADMIN — PHENYLEPHRINE HYDROCHLORIDE 0.3 MCG/KG/MIN: 10 INJECTION INTRAVENOUS at 10:38

## 2021-05-24 RX ADMIN — OXYTOCIN-SODIUM CHLORIDE 0.9% IV SOLN 30 UNIT/500ML 1 ML: 30-0.9/5 SOLUTION at 10:57

## 2021-05-24 RX ADMIN — ACETAMINOPHEN 975 MG: 325 TABLET, FILM COATED ORAL at 23:18

## 2021-05-24 RX ADMIN — KETOROLAC TROMETHAMINE 30 MG: 30 INJECTION, SOLUTION INTRAMUSCULAR at 11:14

## 2021-05-24 RX ADMIN — ONDANSETRON 4 MG: 2 INJECTION INTRAMUSCULAR; INTRAVENOUS at 10:48

## 2021-05-24 RX ADMIN — KETOROLAC TROMETHAMINE 30 MG: 30 INJECTION, SOLUTION INTRAMUSCULAR; INTRAVENOUS at 17:40

## 2021-05-24 RX ADMIN — KETOROLAC TROMETHAMINE 30 MG: 30 INJECTION, SOLUTION INTRAMUSCULAR; INTRAVENOUS at 23:59

## 2021-05-24 RX ADMIN — Medication 10 ML/HR: at 08:50

## 2021-05-24 RX ADMIN — ACETAMINOPHEN 975 MG: 325 TABLET, FILM COATED ORAL at 16:52

## 2021-05-24 RX ADMIN — LIDOCAINE HYDROCHLORIDE,EPINEPHRINE BITARTRATE 15 ML: 20; .005 INJECTION, SOLUTION EPIDURAL; INFILTRATION; INTRACAUDAL; PERINEURAL at 10:20

## 2021-05-24 RX ADMIN — CEFAZOLIN SODIUM 2 G: 2 INJECTION, SOLUTION INTRAVENOUS at 10:35

## 2021-05-24 RX ADMIN — AZITHROMYCIN MONOHYDRATE 500 MG: 500 INJECTION, POWDER, LYOPHILIZED, FOR SOLUTION INTRAVENOUS at 10:22

## 2021-05-24 RX ADMIN — Medication 2 MILLI-UNITS/MIN: at 04:45

## 2021-05-24 NOTE — PROVIDER NOTIFICATION
21 1005   Provider Notification   Provider Name/Title Dr. Valiente   Method of Notification At Bedside   Request Evaluate - Remote   Notification Reason Status Update   MD at bedside to assess POC with patient. MD recommending  section, FHT's and contraction pattern reviewed. Patient agrees to POC, will proceed with  section.

## 2021-05-24 NOTE — PROVIDER NOTIFICATION
05/24/21 0933   Provider Notification   Provider Name/Title Ayaka CNM   Method of Notification At Bedside   Request Evaluate - Remote   Notification Reason Status Update   CNM at bedside for fetal deceleration. CNM updated on labor interventions of position changes, FSE placed, and pitocin discontinued. CNM also updated on SVE. POC discussed with patient will continue to monitor and consult Dr. Valiente.

## 2021-05-24 NOTE — PROVIDER NOTIFICATION
05/23/21 2200   Provider Notification   Provider Name/Title BRIANA Carlton   Method of Notification At Bedside   CNM at bedside to perform SVE and rupture forebag. SVE 1/50/-2, small amount of amniotic fluid with meconium noted.

## 2021-05-24 NOTE — ANESTHESIA PROCEDURE NOTES
Epidural catheter Procedure Note  Pre-Procedure   Staff -        Performed By: Anesthesiologist  Procedure DocumentationProcedure: epidural catheter  Comments:  Pre-Procedure  Performed by Rogers Young MD  Location: OB.      PreAnesthestic Checklist: patient identified, IV checked, risks and benefits discussed, informed consent obtained, monitors and equipment checked, pre-op evaluation and at physician/surgeon's request.    Timeout   Correct Patient: Yes  Correct Procedure: Epidural catheter placement  Correct Site: Yes   Correct Position: Yes    Procedure Documentation  Procedure:   Epidural catheter block for Labor    Patient currently in labor and she and OBMD request a labor epidural to control her labor pains. Patient was interviewed and examined. Procedure and risks including but not limited to bleeding, infection, nerve injury, paralysis, PDPH, and inadequate block requiring intervention discussed with patient. Questions answered. This epidural is to be placed in anticipation of vaginal delivery.  She consents to the epidural procedure.  Time-out was performed.  I or my partners remain immediately available for management of any issues or complications and will monitor at appropriate intervals.  Procedure: Patient sitting. Betadine prep x 3. Sterile drape applied.  Mask and gloves used.  Lidocaine 1%  local infiltration at L 3-4.  17 G. Tuohy needle at L3-4 by loss of resistance into epidural space.  No CSF, paresthesia or blood. 1.5 % Lidocaine with 1:200,000 Epinephrine 5cc test dose. Epidural catheter inserted w/o resistance to 5 cm in epidural space. Then 0.125% bupivicaine with fentanyl 2 mcg/ml 12 cc with NS 5 cc.  Aspiration negative for blood and CSF.   Negative for neuro change, paresthesia or symptoms of intravascular injection or intrathecal injection.  Infusion orders written and infusion of 0.125% bupivicaine with fentanyl 2 mcg/ml at 10cc per hour started.    Rogers Young MD

## 2021-05-24 NOTE — PROGRESS NOTES
"Labor Progress Note:    Patient Name:  Tram Thomas  :      1995  MRN:      [id number]    Assessment:    Cat 2 EFM  Pitocin off   FSE in place   IUP @ 40w2d IOL for prelabor rupture of membranes     GBS- positive, has received 3 doses of PCN  PROM - membranes ruptured 40 xhrs, afebrile, no maternal/fetal tachycardia   Thin meconium stained amniotic fluid  Epidural in place.     Plan:   - Consult [facility]. Family agreeable.   - C/S for failure to progress and fetal intolerance to labor.  - pt on left side.   - Routine CNM support & management.       Subjective:  Tram is coping well with contractions with her epidural. Had an deceration with moni to  60 bmp lasting 5 minutes with slow return to baseline of 120.  Mod variability noted . Pt was put in Trendelenburg and FSE was placed. Then turned to left side. Pit was turned off.  Reviewed current status with Tram, She has been ruptured for a long time, this is the second time pitocin has needed to be turned off due to fetal intolerance and she has made minimal progress. They are agreeable to a OB consult. MD Dr. Valiente called and updated. She had visit with family and plan is to precede for primary c/section.   Andres is supportive at bedside.     Objective:  BP 95/58   Temp 98  F (36.7  C) (Oral)   Resp 16   Ht 1.689 m (5' 6.5\")   Wt 82.6 kg (182 lb)   LMP 2020 (Exact Date)   BMI 28.94 kg/m      FHR: 125 baseline, mod variability, 15 X 15 accels present , no decels.   Contractions: q 3-4 minutes, 50-60 duration, mod to palpation. Soft resting tone.   Cervix: examined by RN- 4 cm, 90%, -1.     Total time spent with patient: 25 minutes, of which >50% time spent counseling and coordination of care.    Provider: Ayaka Bellamy CNM      Date:  2021  Time:  9:47 AM        "

## 2021-05-24 NOTE — PROVIDER NOTIFICATION
05/23/21 2033   Provider Notification   Provider Name/Title BRIANA Carlton   Method of Notification Phone   Request Evaluate - Remote   Notification Reason Other (Comment)  (SROM for 27hrs)   No active labor at this time. Patient just starting to report an increase in cramping. TORB to start IV PCN for SROM >27hrs

## 2021-05-24 NOTE — PROGRESS NOTES
"Labor Progress Note:    Patient Name:  Tram Thomas  :      1995  MRN:      [id number]    Assessment:    IUP @ 40w2d IOL for prelabor rupture of membranes     GBS- positive, has received 3 doses of PCN  Reassuring fetal status, Cat I fetal tracing   PROM - membranes ruptured 39 xhrs, afebrile, no maternal/fetal tachycardia   Pitocin x20 hrs  Thin meconium stained amniotic fluid  Epidural in place.     Plan:   -Continue Pitocin augmentin per protocol   -Routine CNM support & management.   -Encourage position changes, rest as desired.  -Anticipate progress and NSVB.   -Plan for NICU at delivery.  -Reevaluate progress in 2-3 hours or sooner with a change in status.       Subjective:  Tram is coping well with contractions. She is not having lower back pain or feeling pressure. She is on her right side currently. PO fluid intake. Alas in place.  Nicoholas is present and supportive.     Objective:  BP 95/58   Temp 98  F (36.7  C) (Oral)   Resp 16   Ht 1.689 m (5' 6.5\")   Wt 82.6 kg (182 lb)   LMP 2020 (Exact Date)   BMI 28.94 kg/m      FHR: 130 baseline, mod variability, 15 X 15 accels , no decels.   Contractions: q 4-5 minutes, 50-80 duration, mod to palpation. Soft resting tone.   Cervix: deferred, last exam this 3-4 cm.   Other: PCN doses x 3         Total time spent with patient: 15 minutes, of which >50% time spent counseling and coordination of care.    Provider:  Ayaka Bellamy CNM    Date:  2021  Time:  8:48 AM        "

## 2021-05-24 NOTE — PROVIDER NOTIFICATION
05/23/21 2300   Provider Notification   Provider Name/Title BRIANA Carlton   Method of Notification In Department   Strip shows recurrent variables since rupture of forebag. ALINAM on unit and is aware.

## 2021-05-24 NOTE — OP NOTE
[facility]  Obstetrics Service Operative Report    Surgery Date:  May 24, 2021  Surgeon(s): Nathalia Valiente MD     Preoperative Diagnoses:  1.  Intrauterine pregnancy at 40w3d  2.  Fetal intolerance of labor remote from delivery  3.  Labor dystocia    Postoperative diagnoses: Same    Procedure performed:  Primary low segment transverse  section via pfannenstiel incision with 2 layer uterine closure    Anesthesia:  Epidural with duramorph  Quant Blood Loss (mL): 801 mL  Fluid replacement: 800 mL lactated Ringer's.   Urine output: 800 clear yellow urine  Specimens: cord blood  Complications: None      Operative findings: Single liveborn male infant at 1055 hours on 2021. Apgars of 9 and 9 at one and five minutes.  Birth weight:: 9lb 8 oz.  Fetal presentation: direct OP. Amniotic fluid: meconium stained. Placenta intact with 3 vessel cord. Cord gasses not indicated. Nuchal cord x1, moderat caput.  Normal appearing uterus, fallopian tubes, ovaries. No intraabdominal adhesions. No abdominal wall adhesions.      Indication: Tram Thomas is a 26 year old, , who was admitted at 40w3d for ripening and labor augmentation in the setting of prolonged ROM. She developed recurrent late decels overnight causing augmentation to be postponed. She then experienced 2 prolonged decelerations, moni of 60 which is when I was consulted. She remained 4cm dilated from 2am to 10am when I was consulted. I recommended  delivery based on fetal intolerance removed from delivery and labor dystocia. The risks, benefits, and alternatives of  delivery were explained and the patient agreed to proceed.     Procedure details:  After obtaining informed consent the patient was taken to the operating room where she received adequate anesthesia prior to the skin incision. She was placed in the dorsal supine position with a leftward tilt and prepped and draped in the usual sterile fashion. A patient  safety time out was then performed. The abdomen was then entered through a transverse skin incision. The skin incision was made sharply and carried through the subcutaneous tissue to the fascia.  Fascia was incised in the midline and extended laterally with the Middleton scissors.  The superior margin of the fascial incision was grasped with Kocher clamps and dissected from the underlying muscle with sharp and blunt dissecton, which was then repeated at the lower margin of the fascial incision.  The muscle was  in the midline.  The peritoneum was entered bluntly and the opening extended by digital dissection with care to avoid the bladder. An Vimal O retractor was placed. The vesicouterine peritoneum was entered sharply with Metzenbaum scissors and incision extended laterally. The bladder flap was created digitally. The lower segment of the uterus was opened sharply in a transverse fashion and extended with digital pressure. The infant's head was elevated to the level of the hysterotomy and was delivered atraumatically. The cord was doubly clamped and cut and the infant was handed off to the waiting NICU staff. A segment of the cord was cut and set aside for cord gases if needed. The placenta was extracted with fundal massage and cord traction. The uterus was cleared of all clots and debris.  The uterus was massaged and was noted to be firm.  Oxytocin was given through the running IV.  With vigorous massage as well as administration of oxytocin, good uterine tone was achieved. The hysterotomy was repaired with 1-vicryl suture in a running locked fashion. A 2nd layer of 0-monocryl was used to imbricate the incision and good hemostasis was achieved. The bladder flap was inspected and found to be hemostatic.  The posterior cul-de-sac was suctioned and the uterus was returned to the abdomen. The bilateral pericolic gutters were suctioned.  The hysterotomy was again inspected and found to have no active sites of  bleeding.  The abdominal wall was examined and found to have no active sites of bleeding.  The fascia was closed with a running suture of 0-vicryl.  Subcutaneous tissue was irrigated. Areas that were oozing were controlled with cautery. The subcutaneous tissue was less than 3cm in depth and did not require reapproximation. The skin was closed with 4-0 monocryl. The patient tolerated the procedure well and was taken to the recovery room in stable condition. All sponge, needle and instrument counts were correct x2.    Nathalia Valiente MD   Obstetrics and Gynecology   [facility]

## 2021-05-24 NOTE — PROGRESS NOTES
"CNM PROGRESS NOTE    SUBJECTIVE:    Tram is sitting upright in bed and feels comfortable with her epidural. She was able to sleep from about 0230 until 0630. Boris is asleep at the bedside.     Tram received an epidural around 0130  She was checked by RN around 0230 and noted to be 3-4cm, 80%, -1.   Baby was noted to have recurrent late decelerations from about 6269-0895, so pitocin was turned off for one hour with good improvement in heart tones. Pitocin is now back on at 6mu/min    OBJECTIVE:  BP 96/53   Temp 98.5  F (36.9  C) (Oral)   Resp 16   Ht 1.689 m (5' 6.5\")   Wt 82.6 kg (182 lb)   LMP 08/14/2020 (Exact Date)   BMI 28.94 kg/m      Fetal heart tones: Baseline 135   Variability: moderate  Accelerations: present  Decelerations: absent    Contractions: Pt is devon every 5-6 minutes, lasting 80 seconds and palpates moderate    Cervix: deferred, was 3-4cm / 80% / -1 per RN on last exam  Bloody show noted on underpad   ROM: light meconium fluid     Pitocin- 6 mu/min.  Antibiotics- PCNx3 doses  Cervical ripening: N/A    ASSESSMENT:    IUP @ 40w2d IOL for prelabor rupture of membranes     GBS- positive, has received 3 doses of PCN  Reassuring fetal status, Cat I fetal tracing   PROM - membranes ruptured x38hrs, afebrile, no maternal/fetal tachycardia   Pitocin x18 hrs  Thin meconium stained amniotic fluid    PLAN:   Deferred cervical exam at this point to reduce risk of infection. Discussed that it is reassuring that she has started having some bloody show. Plan to recheck cervix when she is feeling pressure, discomfort, or changes in fetal heart tones or maternal status are noted. She agrees with this plan.   Will give report to oncoming CNM at 0800    Continue titrating pitocin per unit protocol   PCN for GBS prophylaxis , next dose 0845  Reassess in 2-4hrs/prn  Consult on call MD as indicated     Bianka Hilton CNM    "

## 2021-05-24 NOTE — ANESTHESIA PREPROCEDURE EVALUATION
Anesthesia Pre-Procedure Evaluation    Patient: Tram Thomas   MRN: [id number] : 1995        Preoperative Diagnosis: * No surgery found *   Procedure :      Past Medical History:   Diagnosis Date     Chronic UTI       Past Surgical History:   Procedure Laterality Date     C URETER ENDOSCOPY THRU URETEROSTOMY  2005     FEMUR SURGERY Right     sledding accident fracture     GENITOURINARY SURGERY  2005     ORTHOPEDIC SURGERY Right 2002    broken femur, had surgery.      Allergies   Allergen Reactions     Morphine      As a child      Social History     Tobacco Use     Smoking status: Former Smoker     Smokeless tobacco: Never Used   Substance Use Topics     Alcohol use: Not Currently     Comment: socially      Wt Readings from Last 1 Encounters:   21 82.6 kg (182 lb)        Anesthesia Evaluation            ROS/MED HX  ENT/Pulmonary:  - neg pulmonary ROS     Neurologic:       Cardiovascular:  - neg cardiovascular ROS     METS/Exercise Tolerance:     Hematologic:       Musculoskeletal:       GI/Hepatic:       Renal/Genitourinary:       Endo:       Psychiatric/Substance Use:       Infectious Disease:       Malignancy:       Other:            Physical Exam    Airway        Mallampati: II   TM distance: > 3 FB   Neck ROM: full     Respiratory Devices and Support         Dental  no notable dental history         Cardiovascular   cardiovascular exam normal          Pulmonary   pulmonary exam normal                OUTSIDE LABS:  CBC:   Lab Results   Component Value Date    WBC 13.1 (H) 2021    WBC 11.3 (H) 2021    HGB 12.7 2021    HGB 11.8 2021    HCT 38.6 2021    HCT 34.1 (L) 2021     2021     2021     BMP: No results found for: NA, POTASSIUM, CHLORIDE, CO2, BUN, CR, GLC  COAGS: No results found for: PTT, INR, FIBR  POC: No results found for: BGM, HCG, HCGS  HEPATIC: No results found for: ALBUMIN, PROTTOTAL, ALT, AST, GGT, ALKPHOS,  BILITOTAL, BILIDIRECT, LOCO  OTHER: No results found for: PH, LACT, A1C, HARPAL, PHOS, MAG, LIPASE, AMYLASE, TSH, T4, T3, CRP, SED    Anesthesia Plan    ASA Status:  2      Anesthesia Type: Epidural.              Consents    Anesthesia Plan(s) and associated risks, benefits, and realistic alternatives discussed. Questions answered and patient/representative(s) expressed understanding.     - Discussed with:  Patient      - Extended Intubation/Ventilatory Support Discussed: No.      - Patient is DNR/DNI Status: No    Use of blood products discussed: Yes.     - Discussed with: Patient.     - Consented: consented to blood products            Reason for refusal: other.     Postoperative Care    Pain management: IV analgesics.   PONV prophylaxis: Ondansetron (or other 5HT-3), Dexamethasone or Solumedrol     Comments:           neg OB ROS.       Rogers Young MD

## 2021-05-24 NOTE — PROVIDER NOTIFICATION
05/24/21 0900   Provider Notification   Provider Name/Title Ayaka WARREN   Method of Notification In Department   Request Evaluate - Remote   Notification Reason Status Update   CNM updated on LD with labor interventions of position change.  CNM reviewed FHT's and LD continue per CNM will perform SVE.

## 2021-05-24 NOTE — PROVIDER NOTIFICATION
05/24/21 0221   Provider Notification   Provider Name/Title Bianka WARREN   Method of Notification Electronic Page;Phone   Request Evaluate - Remote   Notification Reason SVE;Status Update   Bianka WARREN updated of patient receiving an epidural and being comfortable at this time. Alas placed and SVE 3.5/80/-1. She will be up around [id number].

## 2021-05-24 NOTE — PROGRESS NOTES
"CNM PROGRESS NOTE    SUBJECTIVE:  Tram reports she is becoming more uncomfortable with contractions, but is still able to cope. She is at the bedside on the labor ball.     We reviewed risks/benefits of reassessing for a forebag to rupture. Tram desires to do this.     OBJECTIVE:  /75   Temp 98.7  F (37.1  C) (Oral)   Resp 18   Ht 1.689 m (5' 6.5\")   Wt 82.6 kg (182 lb)   LMP 08/14/2020 (Exact Date)   BMI 28.94 kg/m      Fetal heart tones: Baseline 130s   Variability: moderate  Accelerations: present  Decelerations: absent    Contractions: Pt is devon every 2-3 minutes, lasting 70 seconds and palpates moderate    Cervix: 1cm / 50% / -2, Vtx  ROM: AROM of forebag for scant amount of thin meconium-stained fluid    Pitocin- 16 mu/min.  Antibiotics- PCN, loading dose given   Cervical ripening: N/A    ASSESSMENT:    IUP @ 40w2d IOL for prelabor rupture of membranes    GBS- positive, loading dose of PCN given  Reassuring fetal status   PROM - membranes ruptured x29hrs, afebrile, no maternal/fetal tachycardia   Pitocin x10.5 hrs  Thin meconium stained amniotic fluid    PLAN:   Consider NICU at delivery d/t MSAF. Discussed this with Tram and Boris and they agree with this plan.   Continue titrating pitocin per unit protocol   PCN for GBS prophylaxis   Desires nitrous oxide for pain relief    Reassess in 4hrs/prn      Bianka Hilton CNM    "

## 2021-05-24 NOTE — LACTATION NOTE
Lactation in to assist with a feed. Baby alert, latched well. Nutritive sucking and swallowing noted. Discussed importance of frequent feeds, supply and demand, proper latch. All questions answered at this time. Encouraged patient to call for assistance prn.

## 2021-05-24 NOTE — PLAN OF CARE
Data: Tram Thomas transferred to [id number] via cart at 1355. Baby transferred via parent's arms.  Action: Receiving unit notified of transfer: Yes. Patient and family notified of room change. Report given to Afsaneh ALAN. Belongings sent to receiving unit. Accompanied by Registered Nurse. Oriented patient to surroundings. Call light within reach. ID bands double-checked with receiving RN.  Response: Patient tolerated transfer and is stable.    Patients mobililty level scored using the bedside mobility assistance tool (BMAT). Patient is at a mobility level test number: 1. Mobility equipment used: hovermat. Required assist of 3 staff members. Further use of BMAT scoring required.

## 2021-05-24 NOTE — ANESTHESIA CARE TRANSFER NOTE
Patient: Tram Thomas    Procedure(s):   SECTION    Diagnosis: 40 weeks gestation of pregnancy [Z3A.40]  Diagnosis Additional Information: No value filed.    Anesthesia Type:   Epidural     Note:      Level of Consciousness: awake  Oxygen Supplementation: room air    Independent Airway: airway patency satisfactory and stable  Dentition: dentition unchanged  Vital Signs Stable: post-procedure vital signs reviewed and stable  Report to RN Given: handoff report given  Patient transferred to: Labor and Delivery    Handoff Report: Identifed the Patient, Identified the Reponsible Provider, Reviewed the pertinent medical history, Discussed the surgical course, Reviewed Intra-OP anesthesia mangement and issues during anesthesia, Set expectations for post-procedure period and Allowed opportunity for questions and acknowledgement of understanding      Vitals: (Last set prior to Anesthesia Care Transfer)  CRNA VITALS  2021 1050 - 2021 1131      2021             NIBP:  134/61    Pulse:  120    NIBP Mean:  70    SpO2:  98 %        Electronically Signed By: Dean Dennis Severson, APRN CRNA  May 24, 2021  11:31 AM

## 2021-05-24 NOTE — CONSULTS
[facility]    Consult  Obstetrics and Gynecology     Date of Admission:  2021    Assessment & Plan   Tram Thomas is a 26 year old  female at 40w3d who is a CNM patient with prolonged PROM since 1700 on . I was consulted to evaluated prolonged deceleration and labor dystocia. Intermittent Cat I and Cat II tracing since 0200.     ASSESSMENT:   IUP @ 40w3d  Labor dystocia in the first stage  Fetal intolerance of labor, remote from delivery      PLAN:   I have recommended  delivery. Consented for  section. Will move forward with primary low transverse  section ASAP.     Nathalia Valiente    History of Present Illness   Tram Thomas is a 26 year old female  40w3d  Estimated Date of Delivery: May 21, 2021 is calculated from Patient's last menstrual period was 2020 (exact date). [facility]. I was asked to consult by Ayaka Bellamy CNM for fetal intolerance of labor following her second prolonged deceleration. She is also making minimal labor progress with inadequate cervical change since 0200. At 0-930 she experience a 5 minute deceleration with moni to the 60s. Pitocin augmentation was ceased and baseline returned to 140 with moderate variability. However, given inadequate labor progress and difficulty augmenting due to fetal intolerance, patient is interested in moving forward with  delivery, which I think is very reasonable.     PRENATAL COURSE  Prenatal course was essentially uncomplicated      Recent Labs   Lab Test 21  1130 10/12/20  1053   ABO O O   RH Pos Pos   AS  --  Neg     Rhogam not indicated   Recent Labs   Lab Test 21  1024 10/12/20  1053   HEPBANG  --  Nonreactive   HIAGAB  --  Nonreactive   GBS Positive*  --    RUQIGG  --  25         Prior to Admission Medications   Prior to Admission Medications   Prescriptions Last Dose Informant Patient Reported? Taking?   Prenatal Vit-Fe  Fum-FA-Omega (PRENATAL MULTI +DHA PO) 5/22/2021 at Unknown time  Yes Yes   ketoconazole (NIZORAL) 2 % external shampoo Past Week at Unknown time  No Yes   Sig: Apply topically daily as needed for itching or irritation      Facility-Administered Medications: None     Allergies   Allergies   Allergen Reactions     Morphine      As a child         Immunization History   Immunization History   Administered Date(s) Administered     DTAP (<7y) 1995, 1995, 1995, 04/28/1995, 06/22/1999     DTaP, Unspecified 08/18/2005, 11/05/2019     HPV Quadrivalent 08/20/2012, 01/10/2013     Hep B, Peds or Adolescent 1995, 1995, 1995     HepA-ped 2 Dose 12/30/2008, 07/17/2009     Influenza (IIV3) PF 11/09/2007, 12/30/2008     Influenza Vaccine IM > 6 months Valent IIV4 11/05/2019, 10/19/2020     MMR 04/28/1995, 06/22/1999     Meningococcal (Menactra ) 08/12/2006, 08/20/2012     Meningococcal (Menomune ) 08/02/2006     Poliovirus, inactivated (IPV) 1995, 1995, 02/14/1995, 06/22/1999     TDAP Vaccine (Adacel) 08/18/2005     Tdap (Adacel,Boostrix) 03/01/2021     Varicella 06/22/1999       Past Medical History:   Diagnosis Date     Chronic UTI        Past Surgical History:   Procedure Laterality Date     C URETER ENDOSCOPY THRU URETEROSTOMY  2005     FEMUR SURGERY Right 2002    sledding accident fracture     GENITOURINARY SURGERY  2005     ORTHOPEDIC SURGERY Right 12/2002    broken femur, had surgery.       Vitals:    05/24/21 0705 05/24/21 0715 05/24/21 0815 05/24/21 0915   BP: 113/66 105/59 95/58    Resp:  16 16    Temp:  97.9  F (36.6  C) 98  F (36.7  C) 98.1  F (36.7  C)   TempSrc:  Oral Oral Oral   Weight:       Height:           Abdomen: gravid, single vertex fetus, non-tender, EFW 8 lbs    Constitutional: healthy, alert, active and no distress   Extremities: NT, no edema  Neurologic: Awake, alert, oriented x3  Neuropsychiatric: General: normal, calm and normal eye contact  Heart: Regular  rate and rhythm  Lungs: non-labored breathing, no cough    Nathalia Valiente MD

## 2021-05-24 NOTE — PROVIDER NOTIFICATION
05/24/21 0328   Provider Notification   Provider Name/Title Bianka WARREN   Method of Notification Electronic Page;Phone   Request Evaluate - Remote   Notification Reason Labor Status;Uterine Activity   Updated [place] CN of patient having recurrent late decels with no improvement with position changes and IV fluid bolus. Orders to shut of Pitocin for 1 hour and restart if decels are resolved.

## 2021-05-24 NOTE — PROVIDER NOTIFICATION
05/24/21 0930   Provider Notification   Provider Name/Title Ayaka WARREN    Method of Notification Electronic Page   Request Evaluate in Person   Notification Reason Status Update   CNM requested at bedside for fetal deceleration.

## 2021-05-25 LAB — HGB BLD-MCNC: 9.1 G/DL (ref 11.7–15.7)

## 2021-05-25 PROCEDURE — 120N000001 HC R&B MED SURG/OB

## 2021-05-25 PROCEDURE — 85018 HEMOGLOBIN: CPT | Performed by: OBSTETRICS & GYNECOLOGY

## 2021-05-25 PROCEDURE — 250N000011 HC RX IP 250 OP 636: Performed by: OBSTETRICS & GYNECOLOGY

## 2021-05-25 PROCEDURE — 250N000013 HC RX MED GY IP 250 OP 250 PS 637: Performed by: OBSTETRICS & GYNECOLOGY

## 2021-05-25 PROCEDURE — 36415 COLL VENOUS BLD VENIPUNCTURE: CPT | Performed by: OBSTETRICS & GYNECOLOGY

## 2021-05-25 RX ORDER — FERROUS SULFATE 325(65) MG
325 TABLET ORAL DAILY
Status: DISCONTINUED | OUTPATIENT
Start: 2021-05-25 | End: 2021-05-26 | Stop reason: HOSPADM

## 2021-05-25 RX ADMIN — FERROUS SULFATE TAB 325 MG (65 MG ELEMENTAL FE) 325 MG: 325 (65 FE) TAB at 15:38

## 2021-05-25 RX ADMIN — IBUPROFEN 800 MG: 800 TABLET ORAL at 17:58

## 2021-05-25 RX ADMIN — OXYCODONE HYDROCHLORIDE 5 MG: 5 TABLET ORAL at 17:23

## 2021-05-25 RX ADMIN — ACETAMINOPHEN 975 MG: 325 TABLET, FILM COATED ORAL at 17:23

## 2021-05-25 RX ADMIN — ACETAMINOPHEN 975 MG: 325 TABLET, FILM COATED ORAL at 11:16

## 2021-05-25 RX ADMIN — OXYCODONE HYDROCHLORIDE 5 MG: 5 TABLET ORAL at 03:28

## 2021-05-25 RX ADMIN — DOCUSATE SODIUM 50 MG AND SENNOSIDES 8.6 MG 1 TABLET: 8.6; 5 TABLET, FILM COATED ORAL at 20:22

## 2021-05-25 RX ADMIN — KETOROLAC TROMETHAMINE 30 MG: 30 INJECTION, SOLUTION INTRAMUSCULAR; INTRAVENOUS at 06:06

## 2021-05-25 RX ADMIN — OXYCODONE HYDROCHLORIDE 5 MG: 5 TABLET ORAL at 08:13

## 2021-05-25 RX ADMIN — ACETAMINOPHEN 975 MG: 325 TABLET, FILM COATED ORAL at 05:12

## 2021-05-25 RX ADMIN — OXYCODONE HYDROCHLORIDE 5 MG: 5 TABLET ORAL at 13:24

## 2021-05-25 RX ADMIN — ACETAMINOPHEN 975 MG: 325 TABLET, FILM COATED ORAL at 23:27

## 2021-05-25 RX ADMIN — IBUPROFEN 800 MG: 800 TABLET ORAL at 11:40

## 2021-05-25 RX ADMIN — OXYCODONE HYDROCHLORIDE 5 MG: 5 TABLET ORAL at 21:22

## 2021-05-25 RX ADMIN — DOCUSATE SODIUM 50 MG AND SENNOSIDES 8.6 MG 1 TABLET: 8.6; 5 TABLET, FILM COATED ORAL at 08:13

## 2021-05-25 NOTE — PLAN OF CARE
VS and assessment WNL.  Catheter removed at 1800 and patient has voided without difficulty.  Tolerating regular diet and drinking adequate oral fluids.  Pain managed with Toradol, tylenol, and occasional oxycodone.  Incision covered with island dressing, clean, dry and intact; no signs of infection noted.  Breastfeeding well with latch and positioning assistance, lactation saw this evening.  Education given on infant cares, infant safety, and safe sleep practices, pt verbalizes understanding and asks appropriate questions.  Spouse at bedside and supportive.

## 2021-05-25 NOTE — PROGRESS NOTES
May 25, 2021    DAILY NOTE - POSTOP DAY 1    SUBJECTIVE: Feeling pretty well overall, just sore.    Pain controlled? Yes  Tolerating a regular diet? YES  Ambulating? YES  Voiding without difficulty? Yes  Lochia? minimal  Breastfeeding:  yes        OBJECTIVE:  Vitals:    05/24/21 2209 05/25/21 0045 05/25/21 0610 05/25/21 0826   BP: 116/69 109/72 103/64 107/67   Cuff Size:       Pulse: 92  83 78   Resp: 16 15 16 16   Temp:  98.6  F (37  C) 98  F (36.7  C) 97.6  F (36.4  C)   TempSrc:  Oral Oral Oral   SpO2: 99% 98% 95%    Weight:       Height:           Constitutional: healthy, alert and no distress  Abdomen:  Uterine fundus is firm, non-tender and at the level of the umbilicus   Incision: C/D/I dressing, will remove later.  LE:  none edema, non-tender      LABS:  Hemoglobin   Date Value Ref Range Status   05/25/2021 9.1 (L) 11.7 - 15.7 g/dL Final   05/23/2021 12.7 11.7 - 15.7 g/dL Final     No results found for: RUBELLAABIGG   Lab Results   Component Value Date    ABO O 05/24/2021           Lab Results   Component Value Date    RH Pos 05/24/2021      No components found for: CMP      ASSESSMENT:   POD #1  Primary LTCS    ABLA related to surgery       PLAN:      Continue routine care  Ambulation encouraged  Hemoglobin at 9.1  Iron supplementation  Anticipate discharge in 1-2 days.         Tia Farrell MD

## 2021-05-25 NOTE — PLAN OF CARE
VSS. Patient up ad delilah and voiding excellent amounts without difficulty. Tolerating regular diet and fluids. Fundus firm and bleeding scant. Last dose of scheduled IV Toradol given, and SL removed.  Dressing is C/D/I. Patient able to perform infant cares with help of spouse. Breastfeeding independently. Lanolin provided to prevent tender nipples. Anticipate discharge on 5/26.

## 2021-05-25 NOTE — PROGRESS NOTES
BRIANA Courtesy Visit.    Tram is doing well this morning, s/p C/S on 5/24/21. She is breastfeeding her infant and appears to have good latch. She states she is sore, but pain is well managed. Discussed with her that [facility] service is no longer managing her care, but we are available for support. Encouraged her to discuss follow-up care with MD team. Tram and Andres verbalized gratitude for check in from midwifery service.    Jordan Nagel CNM

## 2021-05-26 VITALS
SYSTOLIC BLOOD PRESSURE: 116 MMHG | RESPIRATION RATE: 18 BRPM | WEIGHT: 182 LBS | HEART RATE: 90 BPM | HEIGHT: 67 IN | DIASTOLIC BLOOD PRESSURE: 62 MMHG | TEMPERATURE: 97.8 F | OXYGEN SATURATION: 95 % | BODY MASS INDEX: 28.56 KG/M2

## 2021-05-26 PROBLEM — Z34.01 ENCOUNTER FOR SUPERVISION OF NORMAL FIRST PREGNANCY IN FIRST TRIMESTER: Status: RESOLVED | Noted: 2020-11-22 | Resolved: 2021-05-26

## 2021-05-26 PROBLEM — O26.843 UTERINE SIZE-DATE DISCREPANCY IN THIRD TRIMESTER: Status: RESOLVED | Noted: 2021-03-29 | Resolved: 2021-05-26

## 2021-05-26 PROBLEM — O36.60X0 LARGE FOR GESTATIONAL AGE FETUS AFFECTING MANAGEMENT OF MOTHER, DELIVERED: Status: ACTIVE | Noted: 2021-05-26

## 2021-05-26 PROBLEM — O26.03 EXCESSIVE WEIGHT GAIN DURING PREGNANCY IN THIRD TRIMESTER: Status: RESOLVED | Noted: 2020-11-23 | Resolved: 2021-05-26

## 2021-05-26 PROBLEM — D62 ANEMIA DUE TO BLOOD LOSS, ACUTE: Status: ACTIVE | Noted: 2021-05-26

## 2021-05-26 PROBLEM — L25.3 CHEMICAL DERMATITIS: Status: ACTIVE | Noted: 2021-05-26

## 2021-05-26 PROCEDURE — 250N000013 HC RX MED GY IP 250 OP 250 PS 637: Performed by: OBSTETRICS & GYNECOLOGY

## 2021-05-26 RX ORDER — OXYCODONE HYDROCHLORIDE 5 MG/1
5 TABLET ORAL EVERY 6 HOURS PRN
Qty: 12 TABLET | Refills: 0 | Status: SHIPPED | OUTPATIENT
Start: 2021-05-26

## 2021-05-26 RX ORDER — IBUPROFEN 600 MG/1
600 TABLET, FILM COATED ORAL EVERY 6 HOURS PRN
Qty: 30 TABLET | Refills: 0 | Status: SHIPPED | OUTPATIENT
Start: 2021-05-26

## 2021-05-26 RX ORDER — HYDROCORTISONE 10 MG/ML
LOTION TOPICAL 2 TIMES DAILY
Qty: 118 ML | Refills: 1 | Status: SHIPPED | OUTPATIENT
Start: 2021-05-26

## 2021-05-26 RX ADMIN — OXYCODONE HYDROCHLORIDE 5 MG: 5 TABLET ORAL at 08:54

## 2021-05-26 RX ADMIN — ACETAMINOPHEN 975 MG: 325 TABLET, FILM COATED ORAL at 06:36

## 2021-05-26 RX ADMIN — IBUPROFEN 800 MG: 800 TABLET ORAL at 08:54

## 2021-05-26 RX ADMIN — IBUPROFEN 800 MG: 800 TABLET ORAL at 02:13

## 2021-05-26 RX ADMIN — OXYCODONE HYDROCHLORIDE 5 MG: 5 TABLET ORAL at 03:09

## 2021-05-26 RX ADMIN — DOCUSATE SODIUM 50 MG AND SENNOSIDES 8.6 MG 2 TABLET: 8.6; 5 TABLET, FILM COATED ORAL at 08:54

## 2021-05-26 RX ADMIN — ACETAMINOPHEN 975 MG: 325 TABLET, FILM COATED ORAL at 13:06

## 2021-05-26 RX ADMIN — FERROUS SULFATE TAB 325 MG (65 MG ELEMENTAL FE) 325 MG: 325 (65 FE) TAB at 08:54

## 2021-05-26 NOTE — PLAN OF CARE
VS stable and WNL.  Completing self cares independently and voiding without difficulty.  Tolerating regular diet.  Pain managed with IBU, Tylenol, oxycodone, and heat on back, with stated relief.  Incision open to air, no drainage, no signs of infection.  Started oral iron as ordered for 9.1 hemoglobin.  Breastfeeding well with minimal assist.  Completed birth certificate and edinburgh depression scale was a 1.  Patient is contemplating if they would like to go home tomorrow or stay another day, patient will talk to MD in morning.  Spouse at bedside and supportive.

## 2021-05-26 NOTE — DISCHARGE SUMMARY
"Discharge Summary        May 26, 2021     Tram Thomas MRN# [id number]   YOB: 1995 Age: 26 year old     Date of Admission:  2021  Date of Discharge:  2021  Admitting Physician:  Ayaka Bellamy CNM  Discharge Physician:             Bryan Gutierrez MD  Discharging Service:  Obstetrics & Gynecology    Home clinic: [facility]  Primary Provider: Elizabeth Lange          Admission Diagnoses:   Encounter for triage in pregnant patient [Z36.89]  Indication for care in labor or delivery [O75.9]          Discharge Diagnosis:     Patient Active Problem List   Diagnosis     Allergic conjunctivitis, bilateral     Attention deficit disorder     Food poisoning     History of       delivery delivered     Large for gestational age fetus affecting management of mother, delivered     Chemical dermatitis     Anemia due to blood loss, acute                Discharge Disposition:      Discharged to home           Condition on Discharge:     Discharge condition: Stable    Discharge vitals: Blood pressure 116/62, pulse 90, temperature 97.8  F (36.6  C), temperature source Oral, resp. rate 18, height 1.689 m (5' 6.5\"), weight 82.6 kg (182 lb), last menstrual period 2020, SpO2 95 %, unknown if currently breastfeeding.   Code status on discharge: Full Code           Procedures / Labs / Imaging:   Procedures:   All laboratory data reviewed.  Imaging: N/A          Medications Prior to Admission:     Medications Prior to Admission   Medication Sig Dispense Refill Last Dose     ketoconazole (NIZORAL) 2 % external shampoo Apply topically daily as needed for itching or irritation 120 mL 0 Past Week at Unknown time     Prenatal Vit-Fe Fum-FA-Omega (PRENATAL MULTI +DHA PO)    2021 at Unknown time             Discharge Medications:     Current Discharge Medication List      START taking these medications    Details   hydrocortisone (CORTIZONE 10) 1 % external lotion " Apply topically 2 times daily For abdominal rash.  Qty: 118 mL, Refills: 1    Associated Diagnoses: Chemical dermatitis      ibuprofen (ADVIL/MOTRIN) 600 MG tablet Take 1 tablet (600 mg) by mouth every 6 hours as needed for pain Take w/ food  Qty: 30 tablet, Refills: 0    Associated Diagnoses:  delivery delivered      oxyCODONE (ROXICODONE) 5 MG tablet Take 1 tablet (5 mg) by mouth every 6 hours as needed for breakthrough pain or severe pain  Qty: 12 tablet, Refills: 0    Associated Diagnoses:  delivery delivered         CONTINUE these medications which have NOT CHANGED    Details   ketoconazole (NIZORAL) 2 % external shampoo Apply topically daily as needed for itching or irritation  Qty: 120 mL, Refills: 0    Associated Diagnoses: Tinea versicolor      Prenatal Vit-Fe Fum-FA-Omega (PRENATAL MULTI +DHA PO)                    Consultations:     No consultations were requested during this admission             Brief History of Illness:   This patient was a 26 year old pregnant female,  1, who presented with intrauterine pregnancy of gestational age  40w3d.          Hospital Course:     Patient was admitted to labor and delivery as a CNM patient who, while in labor had prolonged PROM since 1700 on .  Dr. Valiente was consulted to evaluate a prolonged deceleration and labor dystocia. Intermittent Cat I and Cat II tracing since 0200.  Dr. Valiente recommended a  section as delivery was remote, and Patient consent.  She was taken to the OR and underwent a  section as noted in the separate operative report.  Her postop course was only significant for an abdominal rash corresponding to the Cloroprep surgical prep noted on POD#2.  She had good urine output for the first 24 hours and had her Alas catheter removed on POD#1. Her Hemoglobin 9.1 was consistent for a Postpartum Acute Blood loss anemia but was asymptomatic and required no specific treatment other than prenatal vitamins on  "discharge. She was tolerating clear liquids and began ambulating on POD#1.  By POD#2 she was passing flatus and tolerating regular diet well.  By POD#2 she was ready for D/C home.  Her incision remained clean, dry, and intact without erythema or induration.  She was give Hydrocortisone lotion on discharge home for her rash.  She was D/C'd home POD#2 with instructions to f/u in 6 weeks.          Physical Exam:    O:  Blood pressure 116/62, pulse 90, temperature 97.8  F (36.6  C), temperature source Oral, resp. rate 18, height 1.689 m (5' 6.5\"), weight 82.6 kg (182 lb), last menstrual period 08/14/2020, SpO2 95 %, unknown if currently breastfeeding.        No intake or output data in the 24 hours ending 05/26/21 1201        Abdomen - Non-distended, Normoactive bowel sounds, soft, appropriately tender; Fundus firm, at umbilicus, nontender        Dressing/Incision - clean, dry, intact        Extremities - No calf tenderness           Data:  Hemoglobin   Date Value Ref Range Status   05/25/2021 9.1 (L) 11.7 - 15.7 g/dL Final   05/23/2021 12.7 11.7 - 15.7 g/dL Final     No results found for: RUBELLAABIGG   Lab Results   Component Value Date    ABO O 05/24/2021           Lab Results   Component Value Date    RH Pos 05/24/2021               Discharge Instructions and Follow-Up:     Discharge diet: Regular   Discharge activity: Activity as tolerated   Discharge follow-up: Follow up with Dr. Valiente in 6 weeks   Outpatient therapy: None   Home Care agency: None   Supplies and equipment: Breast Pump   Lines and drains: None   Wound care: Keep Dry, Wash with soap and water, protect from sunlight, do not soak in water for 14 days (Swimming, Baths), but may shower.  Please call if wound becomes red, swollen, hot or begins draining pus like fluid.   Other instructions: None     Bryan Gutierrez MD        "

## 2021-05-26 NOTE — DISCHARGE INSTRUCTIONS
Postop  Birth Instructions  Follow up in 6 weeks for Postpartum visit.  Keep Incision Dry, Wash with soap and water, protect from sunlight, do not soak in water for 14 days (Swimming, Baths), but may shower.  Please call if wound becomes red, swollen, hot or begins draining pus like fluid.    Activity       Do not lift more than 10 pounds for 6 weeks after surgery.  Ask family and friends for help when you need it.    No driving until you have stopped taking your pain medications (usually two weeks after surgery).    No heavy exercise or activity for 6 weeks.  Don't do anything that will put a strain on your surgery site.    Don't strain when using the toilet.  Your care team may prescribe a stool softener if you have problems with your bowel movements.     To care for your incision:       Keep the incision clean and dry.    Do not soak your incision in water. No swimming or hot tubs until it has fully healed. You may soak in the bathtub if the water level is below your incision.    Do not use peroxide, gel, cream, lotion, or ointment on your incision.    Adjust your clothes to avoid pressure on your surgery site (check the elastic in your underwear for example).     You may see a small amount of clear or pink drainage and this is normal.  Check with your health care provider:       If the drainage increases or has an odor.    If the incision reddens, you have swelling, or develop a rash.    If you have increased pain and the medicine we prescribed doesn't help.    If you have a fever above 100.4 F (38 C) with or without chills when placing thermometer under your tongue.   The area around your incision (surgery wound), will feel numb.  This is normal. The numbness should go away in less than a year.     Keep your hands clean:  Always wash your hands before touching your incision (surgery wound). This helps reduce your risk of infection. If your hands aren't dirty, you may use an alcohol hand-rub to clean  your hands. Keep your nails clean and short.    Call your healthcare provider if you have any of these symptoms:       You soak a sanitary pad with blood within 1 hour, or you see blood clots larger than a golf ball.    Bleeding that lasts more than 6 weeks.    Vaginal discharge that smells bad.    Severe pain, cramping or tenderness in your lower belly area.    A need to urinate more frequently (use the toilet more often), more urgently (use the toilet very quickly), or it burns when you urinate.    Nausea and vomiting.    Redness, swelling or pain around a vein in your leg.    Problems breastfeeding or a red or painful area on your breast.    Chest pain and cough or are gasping for air.    Problems with coping with sadness, anxiety or depression. If you have concerns about hurting yourself or the baby, call your provider immediately.      You have questions or concerns after you return home.

## 2021-05-26 NOTE — LACTATION NOTE
Lactation in to see patient before discharge. No questions or concerns. Baby continuing to nurse well per patient.

## 2021-05-26 NOTE — PLAN OF CARE
Patient meeting expected goals. Is up independent in room, meeting all personal and infant needs. VSS. Pain is being managed with Tylenol, Ibuprofen and Oxycodone. Voiding with out difficulty. Incision to low abdomen is VINNY , well approximated, without drainage or signs on infection noted to surrounding tissue. Breastfeeding is going well and bonding well with infant.   Writer worked on latch, which patient states was helpful and found to be more effective than current approach. Spouse is supportive and at bedside. Patient is stable and will be ready for discharge later today.

## 2021-05-26 NOTE — PLAN OF CARE
Data: Vital signs within normal limits. Postpartum checks within normal limits, see flow record. Patient eating and drinking normally. Patient able to empty bladder independently and is up ambulating. No apparent signs of infection. Incision healing well, edges well approximated, open to air. Patient performing self cares and is able to care for infant.  Action: Patient medicated during the shift for pain and cramping. See MAR. Patient also using ice pack for incision and Aqua K pad for back ache with improvement. Patient reassessed within 1 hour after each medication and pain was improved or patient sleeping. Patient education done about safe infant sleep, basic infant care, breastfeeding cues, satiety, and positions, pain management and self cares. See flow record.  Response: Positive attachment behaviors observed with infant. Support person Boris present and very attentive to patient and infant.   Plan: Continue to monitor, assess, and prepare for discharge. Anticipate discharge on 05/27/21.

## 2021-05-26 NOTE — PLAN OF CARE
All discharge instructions were reviewed with patient by writer and all questions answered. Discharge medications were given and reviewed with patient by writer and next times available discussed.  Patient left unit with infant and all belongings at 1420. [facility] home care referral faxed over.

## 2021-05-28 ENCOUNTER — MEDICAL CORRESPONDENCE (OUTPATIENT)
Dept: HEALTH INFORMATION MANAGEMENT | Facility: CLINIC | Age: 26
End: 2021-05-28

## 2021-06-19 ENCOUNTER — HEALTH MAINTENANCE LETTER (OUTPATIENT)
Age: 26
End: 2021-06-19

## 2021-07-06 ENCOUNTER — PRENATAL OFFICE VISIT (OUTPATIENT)
Dept: OBGYN | Facility: CLINIC | Age: 26
End: 2021-07-06
Payer: COMMERCIAL

## 2021-07-06 VITALS
SYSTOLIC BLOOD PRESSURE: 110 MMHG | DIASTOLIC BLOOD PRESSURE: 64 MMHG | HEIGHT: 67 IN | WEIGHT: 168 LBS | BODY MASS INDEX: 26.37 KG/M2

## 2021-07-06 PROBLEM — L25.3 CHEMICAL DERMATITIS: Status: RESOLVED | Noted: 2021-05-26 | Resolved: 2021-07-06

## 2021-07-06 PROBLEM — A05.9 FOOD POISONING: Status: RESOLVED | Noted: 2021-03-16 | Resolved: 2021-07-06

## 2021-07-06 PROBLEM — D62 ANEMIA DUE TO BLOOD LOSS, ACUTE: Status: RESOLVED | Noted: 2021-05-26 | Resolved: 2021-07-06

## 2021-07-06 PROBLEM — O36.60X0 LARGE FOR GESTATIONAL AGE FETUS AFFECTING MANAGEMENT OF MOTHER, DELIVERED: Status: RESOLVED | Noted: 2021-05-26 | Resolved: 2021-07-06

## 2021-07-06 PROCEDURE — 99207 PR POST PARTUM EXAM: CPT | Performed by: OBSTETRICS & GYNECOLOGY

## 2021-07-06 ASSESSMENT — PATIENT HEALTH QUESTIONNAIRE - PHQ9: SUM OF ALL RESPONSES TO PHQ QUESTIONS 1-9: 0

## 2021-07-06 ASSESSMENT — MIFFLIN-ST. JEOR: SCORE: 1521.73

## 2021-07-06 NOTE — NURSING NOTE
"Chief Complaint   Patient presents with     Post Partum Exam     21         Initial /64 (BP Location: Right arm, Patient Position: Sitting, Cuff Size: Adult Regular)   Ht 1.689 m (5' 6.5\")   Wt 76.2 kg (168 lb)   LMP 2020 (Exact Date)   Breastfeeding Yes   BMI 26.71 kg/m   Estimated body mass index is 26.71 kg/m  as calculated from the following:    Height as of this encounter: 1.689 m (5' 6.5\").    Weight as of this encounter: 76.2 kg (168 lb).  BP completed using cuff size: large    Questioned patient about current smoking habits.  Pt. has never smoked.          The following HM Due: NONE    Jordan Alva CMA                "

## 2021-07-06 NOTE — PROGRESS NOTES
"SUBJECTIVE:  Tram Thomas,  is here for a postpartum visit.  She had a  Section  on 21 delivering a healthy baby boy, weighing 9 lbs 8 oz at term.        Last PHQ-9 score on record=   PHQ-9 SCORE 2021   PHQ-9 Total Score 0     No flowsheet data found.    Pap: (  Lab Results   Component Value Date    PAP NIL 10/19/2020       Delivery complications:  No  Breast feeding:  No  Bladder problems:  No  Bowel problems/hemorrhoids:  No  Episiotomy/laceration/incision healed? No  Vaginal flow:  None  [place]:  Yes, no pain or discomfort  Contraception: condoms  Emotional adjustment:  doing well and happy      OBJECTIVE:  Vitals: /64 (BP Location: Right arm, Patient Position: Sitting, Cuff Size: Adult Regular)   Ht 1.689 m (5' 6.5\")   Wt 76.2 kg (168 lb)   LMP 2020 (Exact Date)   Breastfeeding Yes   BMI 26.71 kg/m    BMI= Body mass index is 26.71 kg/m .  General - pleasant female in no acute distress.  Abdomen - Incision well-healed    ASSESSMENT:    ICD-10-CM    1. Routine postpartum follow-up  Z39.2        PLAN:  May resume normal activities without restrictions.  Pap smear was not done today.    Full counseling was provided, and all questions were answered.   Return to clinic in one year for an annual visit.   Ok to resume normal physical activity, lifting, and exercise.     Nathalia Valiente MD      "

## 2021-10-10 ENCOUNTER — HEALTH MAINTENANCE LETTER (OUTPATIENT)
Age: 26
End: 2021-10-10

## 2022-04-25 NOTE — PATIENT INSTRUCTIONS
[email or web address]    Thank you for coming to see the Midwives at the   [facility]      We will notify you about your labs that were drawn today once we get the results back or if you have [employer] they will be posted there as well      We will call you personally with results that require further discussion      If any referrals were ordered today you should be getting a call in the next week or you may need to call the number listed with your referral to schedule.            If you need any refills of medications please call your pharmacy and they will contact us      If you have any questions or concerns before your next visit, you can reach the nurse midwife on call by calling our pager number [phone].      If you  wish to schedule another appointment, please call our office at [phone]. You can also make appointments through [employer]        If you have a medical emergency please call [phone].    Because you are pregnant, we have additional resources for you:      You may call our consulting RN's during normal business hours for non-urgent questions about your pregnancy.      After hours you may also page the midwife on call for urgent questions or issues at [phone].  There is always a midwife on call 24 hours a day.    Prenatal Reminders:    Before 14 weeks: Dating ultrasound, Genetic testing       This ultrasound helps us determine your dates accurately. Verifi can be drawn anytime after 10 weeks of gestation  16 weeks: Optional genetic testing (quad screen) or single AFP       This testing helps understand your baby's risk for some genetic abnormalities.  20 weeks:  Screening ultrasound (fetal survey)       This testing will look for early growth abnormalities, and may tell the baby's sex if you wish to find out.  28 weeks: One hour sugar test (GCT), hemoglobin and platelets       This test helps identify diabetes of pregnancy or gestational diabetes.   We also look      at the iron in your blood and how well your blood clots.  28 - 36 weeks: Tetanus shot (Tdap)       This shot helps protect you and your baby from tetanus and whooping cough.  36 weeks and later: Group B Strep test (GBS)       This test helps predict if you need antibiotics in labor to prevent infection in your baby.  Anytime September to April:  Flu shot       This shot helps protect you and your family from the flu.  This is especially important during pregnancy        Any time during or after your pregnancy you may experience increased depression and/or mood changes.    We are here to support you. Please contact us if you are:    Feeling anxious    Overwhelmed or sad     Trouble sleeping    Crying uncontrollably    Trouble caring for yourself or baby.    The typical schedule after your first visit today you can expect:     Visit 2 - 12-16 weeks  Visit 3 - 20 weeks  Visit 4 - 24 weeks  Visit 5 - 28 weeks  Visit 6 - 32 weeks  Visit 7 - 34 weeks  Visit 8 - 36 weeks  Weekly after 36 weeks until delivery.    If anything comes up between your visits or you have concerns please don't hesitate to contact us.    Secure access to your medical record:  Use [employer] (secure email communication and access to your chart) to send your primary care provider a message or make an appointment. Ask someone on your Team how to sign up for [employer]. To log on to [employer] or for more information in [employer] please visit the website at [email or web address].       Certified Nurse Midwife (CNM) Team  SIRIA Church, SIRIA Davies, SIRIA Coombs, SIRIA Rowell, BRIANA    Again, thank you for choosing the midwives at [facility].  We are excited to be a part of your pregnancy. Please let us know how we can best partner with you to improve your and your family's health.     Car

## 2022-09-18 ENCOUNTER — HEALTH MAINTENANCE LETTER (OUTPATIENT)
Age: 27
End: 2022-09-18

## 2023-10-08 ENCOUNTER — HEALTH MAINTENANCE LETTER (OUTPATIENT)
Age: 28
End: 2023-10-08

## (undated) DEVICE — PREP CHLORAPREP 26ML TINTED HI-LITE ORANGE 930815

## (undated) DEVICE — DRSG ABDOMINAL 07 1/2X8" 7197D

## (undated) DEVICE — CAP BABY PINK/BLUE IC-2

## (undated) DEVICE — GLOVE PROTEXIS BLUE W/NEU-THERA 6.5  2D73EB65

## (undated) DEVICE — CATH TRAY FOLEY SURESTEP 16FR DRAIN BAG STATOCK A899916

## (undated) DEVICE — PAD CHUX UNDERPAD 30X36" P3036C

## (undated) DEVICE — DRSG TELFA ISLAND 4X10"

## (undated) DEVICE — LINEN HALF SHEET 5512

## (undated) DEVICE — SU MONOCRYL 4-0 PS-2 27" UND Y426H

## (undated) DEVICE — ESU PENCIL SMOKE EVAC W/ROCKER SWITCH 0703-047-000

## (undated) DEVICE — SU VICRYL 0 CT-1 36" J346H

## (undated) DEVICE — LINEN DRAPE 54X72" 5467

## (undated) DEVICE — GLOVE PROTEXIS POWDER FREE SMT 6.0  2D72PT60X

## (undated) DEVICE — DRSG STERI STRIP 1/2X4" R1547

## (undated) DEVICE — GLOVE PROTEXIS MICRO 6.0  2D73PM60

## (undated) DEVICE — STOCKING SLEEVE VASOPRESS COMPRESSION CALF MED 18" VP501M

## (undated) DEVICE — LINEN BABY BLANKET 5434

## (undated) DEVICE — BLADE CLIPPER SGL USE 9680

## (undated) DEVICE — BAG CLEAR TRASH 1.3M 39X33" P4040C

## (undated) DEVICE — SU VICRYL 1 CTX 36" J371H

## (undated) DEVICE — LINEN FULL SHEET 5511

## (undated) DEVICE — Device

## (undated) DEVICE — SOL NACL 0.9% IRRIG 1000ML BOTTLE 2F7124

## (undated) DEVICE — SU MONOCRYL 0 CT-1 36" UND Y946H

## (undated) DEVICE — SOL WATER IRRIG 1000ML BOTTLE 2F7114

## (undated) DEVICE — TRANSFER DEVICE BLOOD NDL HOLDER 364880

## (undated) DEVICE — ESU GROUND PAD ADULT W/CORD E7507

## (undated) DEVICE — PACK C-SECTION LF PL15OTA83B

## (undated) DEVICE — LINEN TOWEL PACK X10 5473

## (undated) RX ORDER — FENTANYL CITRATE-0.9 % NACL/PF 10 MCG/ML
PLASTIC BAG, INJECTION (ML) INTRAVENOUS
Status: DISPENSED
Start: 2021-05-24

## (undated) RX ORDER — OXYTOCIN/0.9 % SODIUM CHLORIDE 30/500 ML
PLASTIC BAG, INJECTION (ML) INTRAVENOUS
Status: DISPENSED
Start: 2021-05-24

## (undated) RX ORDER — FENTANYL CITRATE 50 UG/ML
INJECTION, SOLUTION INTRAMUSCULAR; INTRAVENOUS
Status: DISPENSED
Start: 2021-05-24

## (undated) RX ORDER — ONDANSETRON 2 MG/ML
INJECTION INTRAMUSCULAR; INTRAVENOUS
Status: DISPENSED
Start: 2021-05-24

## (undated) RX ORDER — KETOROLAC TROMETHAMINE 30 MG/ML
INJECTION, SOLUTION INTRAMUSCULAR; INTRAVENOUS
Status: DISPENSED
Start: 2021-05-24